# Patient Record
Sex: FEMALE | Race: OTHER | Employment: STUDENT | URBAN - METROPOLITAN AREA
[De-identification: names, ages, dates, MRNs, and addresses within clinical notes are randomized per-mention and may not be internally consistent; named-entity substitution may affect disease eponyms.]

---
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
## 2021-08-30 ENCOUNTER — OFFICE VISIT (OUTPATIENT)
Dept: PRIMARY CARE CLINIC | Age: 21
End: 2021-08-30

## 2021-08-30 VITALS
TEMPERATURE: 97.1 F | HEIGHT: 62 IN | SYSTOLIC BLOOD PRESSURE: 113 MMHG | BODY MASS INDEX: 20.61 KG/M2 | HEART RATE: 100 BPM | WEIGHT: 112 LBS | DIASTOLIC BLOOD PRESSURE: 73 MMHG | OXYGEN SATURATION: 100 %

## 2021-08-30 DIAGNOSIS — Z02.1 PHYSICAL EXAM, PRE-EMPLOYMENT: Primary | ICD-10-CM

## 2021-08-30 PROCEDURE — 99213 OFFICE O/P EST LOW 20 MIN: CPT | Performed by: NURSE PRACTITIONER

## 2021-08-30 ASSESSMENT — PATIENT HEALTH QUESTIONNAIRE - PHQ9
2. FEELING DOWN, DEPRESSED OR HOPELESS: 0
SUM OF ALL RESPONSES TO PHQ QUESTIONS 1-9: 0
1. LITTLE INTEREST OR PLEASURE IN DOING THINGS: 0
SUM OF ALL RESPONSES TO PHQ9 QUESTIONS 1 & 2: 0

## 2021-08-30 NOTE — PROGRESS NOTES
Chief Complaint:   Annual Exam    History of Present Illness   Source of history provided by:  patient. Francie Charles is a 24 y.o. old female who presents to walk-in for a pre-employment physical for [address] [place] [address]. Pt states she is feeling well without any complaints at this time. She denies any CP with exertion, KO, dizziness with exertion, history of syncope without trauma, palpitations, heavy menstrual periods, chance of pregnancy, weakness in extremities, recent illness, previous cardiac issues, seizure history, or asthma history. Denies any family history of sudden cardiac death. Pt denies any drug, ETOH, or tobacco use. Wears seat belt in the car at all times. Denies any thoughts of suicide or mental health issues. UTD on immunizations. Received 1st COVID vaccine. Review of Systems   Unless otherwise stated in this report or unable to obtain because of the patient's clinical or mental status as evidenced by the medical record, this patients's positive and negative responses for Review of Systems, constitutional, psych, eyes, ENT, cardiovascular, respiratory, gastrointestinal, neurological, genitourinary, musculoskeletal, integument systems and systems related to the presenting problem are either stated in the preceding or were not pertinent or were negative for the symptoms and/or complaints related to the medical problem. Past Medical History:  has no past medical history on file. Past Surgical History:  has a past surgical history that includes Tonsillectomy. Social History:  reports that she has never smoked. She has never used smokeless tobacco. She reports previous alcohol use. She reports previous drug use. Family History: family history is not on file. Allergies:  Other    Immunization History   Administered Date(s) Administered    COVID-19, Moderna, PF, 100mcg/0.5mL 08/11/2021    PPD Test 08/21/2019     Physical Exam   Vital Signs:  /73   Pulse 100   Temp 97.1 °F (36.2 °C) (Temporal)   Ht 5' 2\" (1.575 m)   Wt 112 lb (50.8 kg)   SpO2 100%   BMI 20.49 kg/m²    Oxygen Saturation Interpretation: Normal.    Constitutional:  A&Ox3, NAD, development consistent with age. Head:  NCAT  Eyes:  EOMI, PERRLA. No conjunctival injection noted. Ears:  External ears without lesions. Ear canals without swelling or exudate. TMs translucent bilaterally with normal light reflex. Throat:  Posterior pharynx without erythema or exudate. Airway patient. Neck:  Supple. Nontender without adenopathy or thyromegaly. Lungs: CTAB without wheezing, rales, or rhonchi. Heart:  RRR, normal heart sounds without pathological murmurs. Abdomen:  Soft, nontender, and nondistended. BS+x4. No guarding, rigidity, or rebound tenderness. No masses. Back:  ROM physiologic. Normal posture and spinal alignment. No costovertebral, paravertebral, intervertebral, or vertebral tenderness or spasm. Extremities:  No tenderness or swelling. ROM physiologic. No neurovascular deficit. Strength and  5/5 bilaterally. Skin:  Warm and appropriately dry without rashes, abrasions, ecchymoses, or lesions. Neuro:  Orientation age-appropriate. Motor functions intact. DTRs 2+ and equal bilaterally. Psych: Pleasant and appropriate. Normal mood and affect. Test Results Section   (All laboratory and radiology results have been personally reviewed by myself)  Labs:  No results found for this visit on 08/30/21. Imaging: All Radiology results interpreted by Radiologist unless otherwise noted. No results found. Assessment / Plan   Impression(s):  Shweta Buck was seen today for annual exam.    Diagnoses and all orders for this visit:    Physical exam, pre-employment    Pt appears to be in good health overall. She is cleared without restrictions. Advised to return immediately with any changes in physical or mental health. All questions answered.     Electronically signed by FARTUN Estrada CNP   DD: 8/30/21    **This report was transcribed using voice recognition software. Every effort was made to ensure accuracy; however, inadvertent computerized transcription errors may be present.

## 2021-10-25 ENCOUNTER — OFFICE VISIT (OUTPATIENT)
Dept: PRIMARY CARE CLINIC | Age: 21
End: 2021-10-25

## 2021-10-25 VITALS
HEART RATE: 113 BPM | HEIGHT: 62 IN | BODY MASS INDEX: 19.32 KG/M2 | SYSTOLIC BLOOD PRESSURE: 122 MMHG | WEIGHT: 105 LBS | TEMPERATURE: 97.2 F | OXYGEN SATURATION: 99 % | DIASTOLIC BLOOD PRESSURE: 70 MMHG

## 2021-10-25 DIAGNOSIS — R55 NEAR SYNCOPE: Primary | ICD-10-CM

## 2021-10-25 DIAGNOSIS — N92.6 IRREGULAR PERIODS: ICD-10-CM

## 2021-10-25 DIAGNOSIS — F32.A MILD DEPRESSION: ICD-10-CM

## 2021-10-25 DIAGNOSIS — R94.31 SHORTENED PR INTERVAL: ICD-10-CM

## 2021-10-25 LAB
ALBUMIN SERPL-MCNC: 4.5 G/DL (ref 3.5–5.2)
ALP BLD-CCNC: 64 U/L (ref 35–104)
ALT SERPL-CCNC: 17 U/L (ref 0–32)
ANION GAP SERPL CALCULATED.3IONS-SCNC: 15 MMOL/L (ref 7–16)
AST SERPL-CCNC: 26 U/L (ref 0–31)
BILIRUB SERPL-MCNC: 0.2 MG/DL (ref 0–1.2)
BUN BLDV-MCNC: 16 MG/DL (ref 6–20)
CALCIUM SERPL-MCNC: 9.4 MG/DL (ref 8.6–10.2)
CHLORIDE BLD-SCNC: 100 MMOL/L (ref 98–107)
CHP ED QC CHECK: NORMAL
CO2: 20 MMOL/L (ref 22–29)
CREAT SERPL-MCNC: 0.7 MG/DL (ref 0.5–1)
GFR AFRICAN AMERICAN: >60
GFR NON-AFRICAN AMERICAN: >60 ML/MIN/1.73
GLUCOSE BLD-MCNC: 115 MG/DL
GLUCOSE BLD-MCNC: 92 MG/DL (ref 74–99)
HCG QUALITATIVE: NEGATIVE
HCT VFR BLD CALC: 23.5 % (ref 34–48)
HEMOGLOBIN: 5.8 G/DL (ref 11.5–15.5)
MCH RBC QN AUTO: 13.3 PG (ref 26–35)
MCHC RBC AUTO-ENTMCNC: 24.7 % (ref 32–34.5)
MCV RBC AUTO: 54 FL (ref 80–99.9)
PDW BLD-RTO: 24.6 FL (ref 11.5–15)
PLATELET # BLD: 408 E9/L (ref 130–450)
PMV BLD AUTO: ABNORMAL FL (ref 7–12)
POTASSIUM SERPL-SCNC: 4.8 MMOL/L (ref 3.5–5)
PROLACTIN: 20.16 NG/ML
RBC # BLD: 4.35 E12/L (ref 3.5–5.5)
SODIUM BLD-SCNC: 135 MMOL/L (ref 132–146)
TOTAL PROTEIN: 7.7 G/DL (ref 6.4–8.3)
TSH SERPL DL<=0.05 MIU/L-ACNC: 1.27 UIU/ML (ref 0.27–4.2)
WBC # BLD: 6 E9/L (ref 4.5–11.5)

## 2021-10-25 PROCEDURE — 36415 COLL VENOUS BLD VENIPUNCTURE: CPT | Performed by: FAMILY MEDICINE

## 2021-10-25 PROCEDURE — 99214 OFFICE O/P EST MOD 30 MIN: CPT | Performed by: FAMILY MEDICINE

## 2021-10-25 PROCEDURE — 93000 ELECTROCARDIOGRAM COMPLETE: CPT | Performed by: FAMILY MEDICINE

## 2021-10-25 PROCEDURE — 82962 GLUCOSE BLOOD TEST: CPT | Performed by: FAMILY MEDICINE

## 2021-10-25 ASSESSMENT — ENCOUNTER SYMPTOMS
DIARRHEA: 0
COUGH: 0
SHORTNESS OF BREATH: 0
NAUSEA: 0
VOMITING: 0
ABDOMINAL PAIN: 0
WHEEZING: 0
CONSTIPATION: 0

## 2021-10-25 NOTE — PROGRESS NOTES
[facility])  Family Medicine Outpatient        SUBJECTIVE:  CC: had concerns including Other (near syncope episode when in the shower been going on for about 6 months ). HPI:  Patricia Ferreira is a female 22 y.o. presented to the walk-in clinic for concerns of near syncope episodes. She reports they first started approximately 6 months ago. She has had them a handful of times. She notes when they have occurred, she has been in the shower. She reports the shower has been warm. When she feels lightheaded and dizzy she will decrease the water temperature and get out as soon as possible. She has never fainted. When she gets out of the shower during these episodes she sits down for a while until she feels better. Her last episode was 2 days ago. Her sister wanted her to come get checked out so she came to walk-in today. She reports her last menstrual period was in August.  She has a history of irregular periods. Her menarche occurred at age 16 and they have been inconsistent since that time. She reports that she gets her periods every couple of months. She has never been sexually active now or in the past.  She denies any blood in her stool or abdominal pain. She reports her weight is stable and that she is eating well. She currently has no PCP. Review of Systems   Constitutional: Negative for appetite change, fatigue and fever. Respiratory: Negative for cough, shortness of breath and wheezing. Cardiovascular: Negative for chest pain, palpitations and leg swelling. Gastrointestinal: Negative for abdominal pain, constipation, diarrhea, nausea and vomiting. Genitourinary: Positive for menstrual problem. Negative for dysuria. Neurological: Positive for dizziness and light-headedness. Negative for seizures, syncope, weakness and numbness. Psychiatric/Behavioral: Negative for suicidal ideas. The patient is not nervous/anxious.          Depression       No outpatient medications have been marked as taking for the 10/25/21 encounter (Office Visit) with Mariah Curran MD.       I have reviewed all pertinent PMHx, PSHx, FamHx, SocialHx, medications, and allergies and updated history as appropriate. OBJECTIVE    VS: /70   Pulse 113   Temp 97.2 °F (36.2 °C) (Temporal)   Ht 5' 2\" (1.575 m)   Wt 105 lb (47.6 kg)   SpO2 99%   BMI 19.20 kg/m²   Physical Exam  Constitutional:       General: She is not in acute distress. Appearance: She is well-developed. She is not diaphoretic. HENT:      Head: Normocephalic and atraumatic. Right Ear: Tympanic membrane normal. There is no impacted cerumen. Left Ear: Tympanic membrane normal. There is no impacted cerumen. Nose: Nose normal.      Mouth/Throat:      Mouth: Mucous membranes are moist.      Pharynx: No oropharyngeal exudate or posterior oropharyngeal erythema. Eyes:      Conjunctiva/sclera: Conjunctivae normal.      Pupils: Pupils are equal, round, and reactive to light. Cardiovascular:      Rate and Rhythm: Normal rate and regular rhythm. Pulmonary:      Effort: Pulmonary effort is normal.      Breath sounds: Normal breath sounds. Abdominal:      General: Bowel sounds are normal. There is no distension. Palpations: Abdomen is soft. Tenderness: There is no abdominal tenderness. Hernia: No hernia is present. Musculoskeletal:         General: No tenderness. Normal range of motion. Cervical back: Normal range of motion and neck supple. Skin:     General: Skin is warm and dry. Neurological:      Mental Status: She is alert and oriented to person, place, and time. Cranial Nerves: No cranial nerve deficit. Motor: No weakness. Psychiatric:         Mood and Affect: Mood normal.         Behavior: Behavior normal.         ASSESSMENT/PLAN:  1. Near syncope  Random glucose after eating 115 mg/dl today. 12 lead NSR with shorted IL interval. See #2. Lmp 8/2021 with irregular periods.  Patient denies blood in her stool. Baseline lab work ordered at this time. Orthostatics negative. Recommend patient take luke warm showers at this time. - POCT Glucose  - EKG 12 Lead    2. Shortened [place] interval  - Ambika Wagoner MD, Cardiology, [place]    3. Irregular periods  Lmp 8/2021. Baseline work up as above. - TSH without Reflex; Future  - CBC; Future  - Comprehensive Metabolic Panel; Future  - Testosterone, free, total; Future  - PROLACTIN; Future  - HCG, SERUM, QUALITATIVE; Future    4. Mild depression [facility])  Patient reports following with psychiatry. Denies any suicidal homicidal ideations. Continue to follow with specialist..    Patient to establish care with me. F/u in office in 2 weeks. I have reviewed my findings and recommendations with Yadira Tay MD  10/26/2021 12:31 PM  Return in about 2 weeks (around 11/8/2021). Counseled regarding above diagnosis, including possible risks and complications, especially if left uncontrolled. Patient counseled on red flag symptoms and if they occur to go to the ED. Discussed medications risk/benefits and possible side effects and alternatives to treatment. Patient and/or guardian verbalizes understanding, agrees, feels comfortable with and wishes to proceed with above treatment plan. Advised patient regarding importance of keeping up with recommended health maintenance and to schedule as soon as possible if overdue, as this is important in assessing for undiagnosed pathology, especially cancer, as well as protecting against potentially harmful/life threatening disease. Patient and/or guardian verbalizes understanding and agrees with above counseling, assessment and plan. All questions answered.     Please note this report has been partially produced using speech recognition software  and may contain errors related to that system including grammar, punctuation and spelling as well as words and phrases that may seem inappropriate. If there are questions or concerns please feel free to contact me to clarify.

## 2021-10-26 ENCOUNTER — TELEPHONE (OUTPATIENT)
Dept: PRIMARY CARE CLINIC | Age: 21
End: 2021-10-26

## 2021-10-26 ENCOUNTER — HOSPITAL ENCOUNTER (EMERGENCY)
Age: 21
Discharge: HOME | End: 2021-10-26
Attending: EMERGENCY MEDICINE
Payer: COMMERCIAL

## 2021-10-26 VITALS
OXYGEN SATURATION: 100 % | RESPIRATION RATE: 18 BRPM | HEART RATE: 91 BPM | DIASTOLIC BLOOD PRESSURE: 64 MMHG | TEMPERATURE: 98.2 F | WEIGHT: 105 LBS | BODY MASS INDEX: 19.2 KG/M2 | SYSTOLIC BLOOD PRESSURE: 104 MMHG

## 2021-10-26 DIAGNOSIS — D50.9 MICROCYTIC ANEMIA: ICD-10-CM

## 2021-10-26 DIAGNOSIS — D50.9 IRON DEFICIENCY ANEMIA, UNSPECIFIED IRON DEFICIENCY ANEMIA TYPE: Primary | ICD-10-CM

## 2021-10-26 DIAGNOSIS — D64.9 ANEMIA, UNSPECIFIED TYPE: Primary | ICD-10-CM

## 2021-10-26 PROBLEM — R94.31 SHORTENED PR INTERVAL: Status: ACTIVE | Noted: 2021-10-26

## 2021-10-26 PROBLEM — F32.A MILD DEPRESSION: Status: ACTIVE | Noted: 2021-10-26

## 2021-10-26 PROBLEM — R55 NEAR SYNCOPE: Status: ACTIVE | Noted: 2021-10-26

## 2021-10-26 PROBLEM — N92.6 IRREGULAR PERIODS: Status: ACTIVE | Noted: 2021-10-26

## 2021-10-26 LAB
ABO/RH: NORMAL
ALBUMIN SERPL-MCNC: 4.6 G/DL (ref 3.5–5.2)
ALP BLD-CCNC: 65 U/L (ref 35–104)
ALT SERPL-CCNC: 15 U/L (ref 0–32)
ANION GAP SERPL CALCULATED.3IONS-SCNC: 11 MMOL/L (ref 7–16)
ANISOCYTOSIS: ABNORMAL
ANISOCYTOSIS: ABNORMAL
ANTIBODY SCREEN: NORMAL
AST SERPL-CCNC: 18 U/L (ref 0–31)
BACTERIA: ABNORMAL /HPF
BASOPHILS ABSOLUTE: 0.04 E9/L (ref 0–0.2)
BASOPHILS ABSOLUTE: 0.08 E9/L (ref 0–0.2)
BASOPHILS RELATIVE PERCENT: 0.9 % (ref 0–2)
BASOPHILS RELATIVE PERCENT: 1.7 % (ref 0–2)
BILIRUB SERPL-MCNC: 0.3 MG/DL (ref 0–1.2)
BILIRUBIN URINE: NEGATIVE
BLOOD BANK DISPENSE STATUS: NORMAL
BLOOD BANK PRODUCT CODE: NORMAL
BLOOD, URINE: NEGATIVE
BPU ID: NORMAL
BUN BLDV-MCNC: 11 MG/DL (ref 6–20)
CALCIUM SERPL-MCNC: 9.7 MG/DL (ref 8.6–10.2)
CHLORIDE BLD-SCNC: 101 MMOL/L (ref 98–107)
CLARITY: CLEAR
CO2: 24 MMOL/L (ref 22–29)
COLOR: YELLOW
CREAT SERPL-MCNC: 0.6 MG/DL (ref 0.5–1)
DESCRIPTION BLOOD BANK: NORMAL
EOSINOPHILS ABSOLUTE: 0 E9/L (ref 0.05–0.5)
EOSINOPHILS ABSOLUTE: 0 E9/L (ref 0.05–0.5)
EOSINOPHILS RELATIVE PERCENT: 0.2 % (ref 0–6)
EOSINOPHILS RELATIVE PERCENT: 0.9 % (ref 0–6)
FERRITIN: 3 NG/ML
FERRITIN: 4 NG/ML
GFR AFRICAN AMERICAN: >60
GFR NON-AFRICAN AMERICAN: >60 ML/MIN/1.73
GLUCOSE BLD-MCNC: 97 MG/DL (ref 74–99)
GLUCOSE URINE: NEGATIVE MG/DL
HCG, URINE, POC: NEGATIVE
HCT VFR BLD CALC: 23.3 % (ref 34–48)
HCT VFR BLD CALC: 24 % (ref 34–48)
HCT VFR BLD CALC: 24.4 % (ref 34–48)
HEMOGLOBIN: 5.9 G/DL (ref 11.5–15.5)
HEMOGLOBIN: 6.1 G/DL (ref 11.5–15.5)
HYPOCHROMIA: ABNORMAL
HYPOCHROMIA: ABNORMAL
IMMATURE RETIC FRACT: 20.3 % (ref 3–15.9)
IMMATURE RETIC FRACT: 21.4 % (ref 3–15.9)
IRON SATURATION: 2 % (ref 15–50)
IRON: 10 MCG/DL (ref 37–145)
KETONES, URINE: NEGATIVE MG/DL
LEUKOCYTE ESTERASE, URINE: ABNORMAL
LYMPHOCYTES ABSOLUTE: 0.82 E9/L (ref 1.5–4)
LYMPHOCYTES ABSOLUTE: 0.99 E9/L (ref 1.5–4)
LYMPHOCYTES RELATIVE PERCENT: 16.5 % (ref 20–42)
LYMPHOCYTES RELATIVE PERCENT: 20.9 % (ref 20–42)
Lab: NORMAL
MCH RBC QN AUTO: 13.4 PG (ref 26–35)
MCH RBC QN AUTO: 13.7 PG (ref 26–35)
MCHC RBC AUTO-ENTMCNC: 25 % (ref 32–34.5)
MCHC RBC AUTO-ENTMCNC: 25.3 % (ref 32–34.5)
MCV RBC AUTO: 53.1 FL (ref 80–99.9)
MCV RBC AUTO: 55 FL (ref 80–99.9)
MONOCYTES ABSOLUTE: 0.19 E9/L (ref 0.1–0.95)
MONOCYTES ABSOLUTE: 0.47 E9/L (ref 0.1–0.95)
MONOCYTES RELATIVE PERCENT: 10.4 % (ref 2–12)
MONOCYTES RELATIVE PERCENT: 3.5 % (ref 2–12)
NEGATIVE QC PASS/FAIL: NORMAL
NEUTROPHILS ABSOLUTE: 3.2 E9/L (ref 1.8–7.3)
NEUTROPHILS ABSOLUTE: 3.74 E9/L (ref 1.8–7.3)
NEUTROPHILS RELATIVE PERCENT: 67.8 % (ref 43–80)
NEUTROPHILS RELATIVE PERCENT: 78.3 % (ref 43–80)
NITRITE, URINE: NEGATIVE
OVALOCYTES: ABNORMAL
OVALOCYTES: ABNORMAL
PATHOLOGIST REVIEW: NORMAL
PDW BLD-RTO: 24.2 FL (ref 11.5–15)
PDW BLD-RTO: 24.6 FL (ref 11.5–15)
PH UA: 7.5 (ref 5–9)
PLATELET # BLD: 358 E9/L (ref 130–450)
PLATELET # BLD: 382 E9/L (ref 130–450)
PMV BLD AUTO: ABNORMAL FL (ref 7–12)
PMV BLD AUTO: ABNORMAL FL (ref 7–12)
POIKILOCYTES: ABNORMAL
POIKILOCYTES: ABNORMAL
POLYCHROMASIA: ABNORMAL
POLYCHROMASIA: ABNORMAL
POSITIVE QC PASS/FAIL: NORMAL
POTASSIUM SERPL-SCNC: 3.9 MMOL/L (ref 3.5–5)
PROTEIN UA: NEGATIVE MG/DL
RBC # BLD: 4.39 E12/L (ref 3.5–5.5)
RBC # BLD: 4.44 E12/L (ref 3.5–5.5)
RBC UA: ABNORMAL /HPF (ref 0–2)
RETIC HGB EQUIVALENT: 13.3 PG (ref 28.2–36.6)
RETIC HGB EQUIVALENT: 13.7 PG (ref 28.2–36.6)
RETICULOCYTE ABSOLUTE COUNT: 0.06 E12/L
RETICULOCYTE ABSOLUTE COUNT: 0.06 E12/L
RETICULOCYTE COUNT PCT: 1.3 % (ref 0.4–1.9)
RETICULOCYTE COUNT PCT: 1.7 % (ref 0.4–1.9)
SCHISTOCYTES: ABNORMAL
SODIUM BLD-SCNC: 136 MMOL/L (ref 132–146)
SPECIFIC GRAVITY UA: 1.02 (ref 1–1.03)
TARGET CELLS: ABNORMAL
TARGET CELLS: ABNORMAL
TEAR DROP CELLS: ABNORMAL
TOTAL IRON BINDING CAPACITY: 461 MCG/DL (ref 250–450)
TOTAL PROTEIN: 8.1 G/DL (ref 6.4–8.3)
TRANSFERRIN: 368 MG/DL (ref 200–360)
TRANSFERRIN: 391 MG/DL (ref 200–360)
UROBILINOGEN, URINE: 0.2 E.U./DL
WBC # BLD: 4.7 E9/L (ref 4.5–11.5)
WBC # BLD: 4.8 E9/L (ref 4.5–11.5)
WBC UA: ABNORMAL /HPF (ref 0–5)

## 2021-10-26 PROCEDURE — 85025 COMPLETE CBC W/AUTO DIFF WBC: CPT

## 2021-10-26 PROCEDURE — 85045 AUTOMATED RETICULOCYTE COUNT: CPT

## 2021-10-26 PROCEDURE — 82728 ASSAY OF FERRITIN: CPT

## 2021-10-26 PROCEDURE — 86900 BLOOD TYPING SEROLOGIC ABO: CPT

## 2021-10-26 PROCEDURE — 86901 BLOOD TYPING SEROLOGIC RH(D): CPT

## 2021-10-26 PROCEDURE — 86923 COMPATIBILITY TEST ELECTRIC: CPT

## 2021-10-26 PROCEDURE — 36415 COLL VENOUS BLD VENIPUNCTURE: CPT

## 2021-10-26 PROCEDURE — 36430 TRANSFUSION BLD/BLD COMPNT: CPT

## 2021-10-26 PROCEDURE — P9016 RBC LEUKOCYTES REDUCED: HCPCS

## 2021-10-26 PROCEDURE — 80053 COMPREHEN METABOLIC PANEL: CPT

## 2021-10-26 PROCEDURE — 99284 EMERGENCY DEPT VISIT MOD MDM: CPT

## 2021-10-26 PROCEDURE — 2580000003 HC RX 258: Performed by: EMERGENCY MEDICINE

## 2021-10-26 PROCEDURE — 81001 URINALYSIS AUTO W/SCOPE: CPT

## 2021-10-26 PROCEDURE — 86850 RBC ANTIBODY SCREEN: CPT

## 2021-10-26 PROCEDURE — 84466 ASSAY OF TRANSFERRIN: CPT

## 2021-10-26 RX ORDER — SODIUM CHLORIDE 9 MG/ML
INJECTION, SOLUTION INTRAVENOUS PRN
Status: COMPLETED | OUTPATIENT
Start: 2021-10-26 | End: 2021-10-26

## 2021-10-26 RX ORDER — FERROUS SULFATE 325(65) MG
325 TABLET ORAL 2 TIMES DAILY
Qty: 60 TABLET | Refills: 0 | Status: SHIPPED | OUTPATIENT
Start: 2021-10-26 | End: 2021-10-27

## 2021-10-26 RX ADMIN — SODIUM CHLORIDE: 9 INJECTION, SOLUTION INTRAVENOUS at 20:30

## 2021-10-26 ASSESSMENT — ENCOUNTER SYMPTOMS
SHORTNESS OF BREATH: 0
CHEST TIGHTNESS: 0
ABDOMINAL PAIN: 0

## 2021-10-26 NOTE — ED NOTES
FIRST PROVIDER CONTACT ASSESSMENT NOTE           Department of Emergency Medicine                 First Provider Note            10/26/21  10:54 AM EDT    Date of Encounter: No admission date for patient encounter. Patient Name: Ventura Junior  : 2000  MRN: [id number]    Chief Complaint: Abnormal Lab (Hgb low )      History of Present Illness:   Ventura Junior is a 22 y.o. female who presents to the ED for low hemoglobin. Patient states that since August she has felt \"dizzy in the shower therefore went to the doctor and was found to have a low hemoglobin. \"  Patient states she was sent in for evaluation. Patient states that she does have irregular menstrual cycles when she does have a cycle they are very heavy. She states that she feels shortness of breath with moving around. Focused Physical Exam:  VS:    ED Triage Vitals [10/26/21 1048]   BP Temp Temp src Pulse Resp SpO2 Height Weight   -- -- -- 124 -- 100 % -- --        Physical Ex: Constitutional: Alert and non-toxic. Medical History:  has no past medical history on file. Surgical History:  has a past surgical history that includes Tonsillectomy. Social History:  reports that she has never smoked. She has never used smokeless tobacco. She reports previous alcohol use. She reports previous drug use. Family History: family history is not on file. Allergies:  Other     Initial Plan of Care: Initiate Treatment-Testing, Proceed toTreatment Area When Bed Available for ED Attending/MLP to Continue Care      ---END OF FIRST PROVIDER CONTACT ASSESSMENT NOTE---  Electronically signed by Vinicio Dave PA-C   DD: 10/26/21       Vinicio Dave PA-C  10/26/21 0628

## 2021-10-26 NOTE — LETTER
[address] [facility] Emergency Department  [address]  [place] [address]  Phone: [phone]               October 26, 2021    Patient: Vianney Delong   YOB: 2000   Date of Visit: 10/26/2021       To Whom It May Concern:    Vianney Delong was seen and treated in our emergency department on 10/26/2021.  She may return to work/school on 10/27/2021    Sincerely,       Tate De Los Santos RN         Signature:__________________________________

## 2021-10-26 NOTE — TELEPHONE ENCOUNTER
Attempted to contact patient again this morning with no answer. Also contacted her sister Abeba Tsang who is on her HIPPA form. No answer. Voicemail left. [address] check to be called on patient. Patient to be advised to go to ER for repeat hgb with immediate work up and treatment as needed.

## 2021-10-26 NOTE — ED PROVIDER NOTES
23-year-old female presenting with concern about low hemoglobin. She has been dizzy and lightheaded for the past few days. Also short of breath with any exertion according to sister who is with her. Patient has irregular, heavy periods. Her last menstrual cycle was about 2 months ago and it tends to come every couple of months. No complaints right now, no lightheadedness while sitting, she is tachycardic at about 120, no shortness of breath, no dyspnea currently, no tachypnea at this time. Abdominal pain is not present. Gradual onset, persistent, moderate to severe, no improvement at home. History reviewed. No pertinent family history. Past Surgical History:   Procedure Laterality Date    TONSILLECTOMY         Review of Systems   Constitutional: Negative for chills and fever. Respiratory: Negative for chest tightness and shortness of breath. Cardiovascular: Negative for chest pain. Gastrointestinal: Negative for abdominal pain. Anemia   All other systems reviewed and are negative. Physical Exam  Constitutional:       General: She is not in acute distress. Appearance: She is well-developed. HENT:      Head: Normocephalic and atraumatic. Eyes:      Conjunctiva/sclera: Conjunctivae normal.      Pupils: Pupils are equal, round, and reactive to light. Neck:      Thyroid: No thyromegaly. Cardiovascular:      Rate and Rhythm: Regular rhythm. Tachycardia present. Pulmonary:      Effort: Pulmonary effort is normal. No respiratory distress. Breath sounds: Normal breath sounds. Abdominal:      General: There is no distension. Palpations: Abdomen is soft. Tenderness: There is no abdominal tenderness. There is no guarding or rebound. Musculoskeletal:         General: No tenderness. Normal range of motion. Cervical back: Normal range of motion. Skin:     General: Skin is warm and dry. Coloration: Skin is pale. Findings: No erythema. Neurological:      Mental Status: She is alert and oriented to person, place, and time. Cranial Nerves: No cranial nerve deficit. Coordination: Coordination normal.          Procedures     [facility]     ED Course as of Oct 27 0027   Tue Oct 26, 2021   1749 TRANSFERRIN(!):    Transferrin 391(!) [SO]      ED Course User Index  [SO] Aranza Snyder DO        ED Course as of Oct 27 0028   Tue Oct 26, 2021   1749 TRANSFERRIN(!):    Transferrin 391(!) [SO]      ED Course User Index  [SO] Aranza Snyder DO       --------------------------------------------- PAST HISTORY ---------------------------------------------  Past Medical History:  has no past medical history on file. Past Surgical History:  has a past surgical history that includes Tonsillectomy. Social History:  reports that she has never smoked. She has never used smokeless tobacco. She reports previous alcohol use. She reports previous drug use. Family History: family history is not on file. The patients home medications have been reviewed. Allergies:  Other    -------------------------------------------------- RESULTS -------------------------------------------------  Labs:  Results for orders placed or performed during the hospital encounter of 10/26/21   CBC auto differential   Result Value Ref Range    WBC 4.7 4.5 - 11.5 E9/L    RBC 4.44 3.50 - 5.50 E12/L    Hemoglobin 6.1 (LL) 11.5 - 15.5 g/dL    Hematocrit 24.4 (L) 34.0 - 48.0 %    MCV 55.0 (L) 80.0 - 99.9 fL    MCH 13.7 (L) 26.0 - 35.0 pg    MCHC 25.0 (L) 32.0 - 34.5 %    RDW 24.6 (H) 11.5 - 15.0 fL    Platelets 544 595 - 634 E9/L    MPV NOT CALC 7.0 - 12.0 fL    Neutrophils % 67.8 43.0 - 80.0 %    Lymphocytes % 20.9 20.0 - 42.0 %    Monocytes % 10.4 2.0 - 12.0 %    Eosinophils % 0.9 0.0 - 6.0 %    Basophils % 0.9 0.0 - 2.0 %    Neutrophils Absolute 3.20 1.80 - 7.30 E9/L    Lymphocytes Absolute 0.99 (L) 1.50 - 4.00 E9/L    Monocytes Absolute 0.47 0.10 - 0.95 E9/L    Eosinophils Absolute 0.00 (L) 0.05 - 0.50 E9/L    Basophils Absolute 0.04 0.00 - 0.20 E9/L    Anisocytosis 2+     Polychromasia 2+     Hypochromia 3+     Poikilocytes 2+     Ovalocytes 2+     Target Cells 1+    Comprehensive Metabolic Panel   Result Value Ref Range    Sodium 136 132 - 146 mmol/L    Potassium 3.9 3.5 - 5.0 mmol/L    Chloride 101 98 - 107 mmol/L    CO2 24 22 - 29 mmol/L    Anion Gap 11 7 - 16 mmol/L    Glucose 97 74 - 99 mg/dL    BUN 11 6 - 20 mg/dL    CREATININE 0.6 0.5 - 1.0 mg/dL    GFR Non-African American >60 >=60 mL/min/1.73    GFR African American >60     Calcium 9.7 8.6 - 10.2 mg/dL    Total Protein 8.1 6.4 - 8.3 g/dL    Albumin 4.6 3.5 - 5.2 g/dL    Total Bilirubin 0.3 0.0 - 1.2 mg/dL    Alkaline Phosphatase 65 35 - 104 U/L    ALT 15 0 - 32 U/L    AST 18 0 - 31 U/L   FERRITIN   Result Value Ref Range    Ferritin 3 ng/mL   RETICULOCYTES   Result Value Ref Range    Retic Ct Pct 1.3 0.4 - 1.9 %    Retic Ct Abs 0.060 E12/L    Immature Retic Fract 21.4 (H) 3.0 - 15.9 %    Hematocrit 23.3 (L) 34.0 - 48.0 %    Retic HGB Equivalent 13.7 (L) 28.2 - 36.6 pg   TRANSFERRIN   Result Value Ref Range    Transferrin 391 (H) 200 - 360 mg/dL   Urinalysis   Result Value Ref Range    Color, UA Yellow Straw/Yellow    Clarity, UA Clear Clear    Glucose, Ur Negative Negative mg/dL    Bilirubin Urine Negative Negative    Ketones, Urine Negative Negative mg/dL    Specific Gravity, UA 1.020 1.005 - 1.030    Blood, Urine Negative Negative    pH, UA 7.5 5.0 - 9.0    Protein, UA Negative Negative mg/dL    Urobilinogen, Urine 0.2 <2.0 E.U./dL    Nitrite, Urine Negative Negative    Leukocyte Esterase, Urine SMALL (A) Negative   CBC Auto Differential   Result Value Ref Range    WBC 4.8 4.5 - 11.5 E9/L    RBC 4.39 3.50 - 5.50 E12/L    Hemoglobin 5.9 (LL) 11.5 - 15.5 g/dL    MCV 53.1 (L) 80.0 - 99.9 fL    MCH 13.4 (L) 26.0 - 35.0 pg    MCHC 25.3 (L) 32.0 - 34.5 %    RDW 24.2 (H) 11.5 - 15.0 fL    Platelets 999 884 - 927 E9/L    MPV NOT CALC 7.0 - 12.0 fL    Neutrophils % 78.3 43.0 - 80.0 %    Lymphocytes % 16.5 (L) 20.0 - 42.0 %    Monocytes % 3.5 2.0 - 12.0 %    Eosinophils % 0.2 0.0 - 6.0 %    Basophils % 1.7 0.0 - 2.0 %    Neutrophils Absolute 3.74 1.80 - 7.30 E9/L    Lymphocytes Absolute 0.82 (L) 1.50 - 4.00 E9/L    Monocytes Absolute 0.19 0.10 - 0.95 E9/L    Eosinophils Absolute 0.00 (L) 0.05 - 0.50 E9/L    Basophils Absolute 0.08 0.00 - 0.20 E9/L    Anisocytosis 2+     Polychromasia 2+     Hypochromia 2+     Poikilocytes 1+     Schistocytes 2+     Ovalocytes 1+     Target Cells 1+     Tear Drop Cells 1+    Microscopic Urinalysis   Result Value Ref Range    WBC, UA 1-3 0 - 5 /HPF    RBC, UA NONE 0 - 2 /HPF    Bacteria, UA RARE (A) None Seen /HPF   POC Pregnancy Urine Qual   Result Value Ref Range    HCG, Urine, POC Negative Negative    Lot Number [id number]     Positive QC Pass/Fail Pass     Negative QC Pass/Fail Pass    TYPE AND SCREEN   Result Value Ref Range    ABO/Rh O POS     Antibody Screen NEG    PREPARE RBC (CROSSMATCH)   Result Value Ref Range    Product Code Blood Bank [id number]     Description Blood Bank Red Blood Cells, Apheresis, Leuko-reduced     Unit Number [id number]     Dispense Status Blood Bank issued        Radiology:  No orders to display       ------------------------- NURSING NOTES AND VITALS REVIEWED ---------------------------  Date / Time Roomed:  10/26/2021  2:39 PM  ED Bed Assignment:  37/37    The nursing notes within the ED encounter and vital signs as below have been reviewed. /64   Pulse 91   Temp 98.2 °F (36.8 °C)   Resp 18   Wt 105 lb (47.6 kg)   SpO2 100%   BMI 19.20 kg/m²   Oxygen Saturation Interpretation: Normal      ------------------------------------------ PROGRESS NOTES ------------------------------------------  I have spoken with the patient and discussed todays results, in addition to providing specific details for the plan of care and counseling regarding the diagnosis and prognosis.   Their questions are answered at this time and they are agreeable with the plan. I discussed at length with them reasons for immediate return here for re evaluation. They will followup with primary care by calling their office tomorrow. --------------------------------- ADDITIONAL PROVIDER NOTES ---------------------------------  At this time the patient is without objective evidence of an acute process requiring hospitalization or inpatient management. They have remained hemodynamically stable throughout their entire ED visit and are stable for discharge with outpatient follow-up. The plan has been discussed in detail and they are aware of the specific conditions for emergent return, as well as the importance of follow-up. Discharge Medication List as of 10/26/2021  7:21 PM      START taking these medications    Details   ferrous sulfate (IRON 325) 325 (65 Fe) MG tablet Take 1 tablet by mouth 2 times daily, Disp-60 tablet, R-0Print             Diagnosis:  1. Anemia, unspecified type        Disposition:  Patient's disposition: Discharge to home  Patient's condition is stable.            Bhargav Quinn DO  10/27/21 0028

## 2021-10-26 NOTE — TELEPHONE ENCOUNTER
Patient called back. Patient currently asymptomatic. No previous baseline hgb and denies any history of anemia. Discussed results and importance of going to the ED this morning for repeat hgb along with further immediate work up/treatment as indicated. Patient amendable to go to the ED and reports her sister will take her now to [facility] E's main.

## 2021-10-27 LAB
PATHOLOGIST REVIEW: NORMAL
SEX HORMONE BINDING GLOBULIN: 79 NMOL/L (ref 30–135)
TESTOSTERONE FREE-NONMALE: 7.7 PG/ML (ref 0.8–7.4)
TESTOSTERONE TOTAL: 77 NG/DL (ref 20–70)

## 2021-10-27 RX ORDER — DOCUSATE SODIUM 100 MG/1
100 CAPSULE, LIQUID FILLED ORAL 2 TIMES DAILY PRN
Qty: 60 CAPSULE | Refills: 0 | Status: SHIPPED | OUTPATIENT
Start: 2021-10-27 | End: 2021-11-26

## 2021-10-27 RX ORDER — FERROUS SULFATE 325(65) MG
325 TABLET ORAL
Qty: 90 TABLET | Refills: 0
Start: 2021-10-27 | End: 2021-12-08

## 2021-10-27 NOTE — ED NOTES
Per this RNs conversation with Dr Nolberto Che, pt does not need to have her hemoglobin/ HCT redrawn prior to dc from ED. Pt is stable leaving ED. Denies dizziness and shortness of breath. Pt with sister at time of dc.       Marly Edward RN  10/26/21 2038

## 2021-10-28 ENCOUNTER — NURSE ONLY (OUTPATIENT)
Dept: PRIMARY CARE CLINIC | Age: 21
End: 2021-10-28

## 2021-10-28 DIAGNOSIS — D50.9 IRON DEFICIENCY ANEMIA, UNSPECIFIED IRON DEFICIENCY ANEMIA TYPE: ICD-10-CM

## 2021-10-28 LAB
ANISOCYTOSIS: ABNORMAL
BASOPHILS ABSOLUTE: 0.07 E9/L (ref 0–0.2)
BASOPHILS RELATIVE PERCENT: 1.7 % (ref 0–2)
EOSINOPHILS ABSOLUTE: 0.1 E9/L (ref 0.05–0.5)
EOSINOPHILS RELATIVE PERCENT: 2.6 % (ref 0–6)
HCT VFR BLD CALC: 29.1 % (ref 34–48)
HEMOGLOBIN: 7.8 G/DL (ref 11.5–15.5)
HYPOCHROMIA: ABNORMAL
LYMPHOCYTES ABSOLUTE: 0.84 E9/L (ref 1.5–4)
LYMPHOCYTES RELATIVE PERCENT: 20.9 % (ref 20–42)
MCH RBC QN AUTO: 15.6 PG (ref 26–35)
MCHC RBC AUTO-ENTMCNC: 26.8 % (ref 32–34.5)
MCV RBC AUTO: 58.3 FL (ref 80–99.9)
MONOCYTES ABSOLUTE: 0.24 E9/L (ref 0.1–0.95)
MONOCYTES RELATIVE PERCENT: 6.1 % (ref 2–12)
NEUTROPHILS ABSOLUTE: 2.76 E9/L (ref 1.8–7.3)
NEUTROPHILS RELATIVE PERCENT: 68.7 % (ref 43–80)
OVALOCYTES: ABNORMAL
PDW BLD-RTO: 30.4 FL (ref 11.5–15)
PLATELET # BLD: 273 E9/L (ref 130–450)
PMV BLD AUTO: ABNORMAL FL (ref 7–12)
POIKILOCYTES: ABNORMAL
POLYCHROMASIA: ABNORMAL
RBC # BLD: 4.99 E12/L (ref 3.5–5.5)
SCHISTOCYTES: ABNORMAL
TARGET CELLS: ABNORMAL
TEAR DROP CELLS: ABNORMAL
WBC # BLD: 4 E9/L (ref 4.5–11.5)

## 2021-11-03 ENCOUNTER — OFFICE VISIT (OUTPATIENT)
Dept: PRIMARY CARE CLINIC | Age: 21
End: 2021-11-03

## 2021-11-03 VITALS
HEIGHT: 62 IN | DIASTOLIC BLOOD PRESSURE: 60 MMHG | TEMPERATURE: 97.7 F | OXYGEN SATURATION: 99 % | HEART RATE: 109 BPM | WEIGHT: 112 LBS | SYSTOLIC BLOOD PRESSURE: 102 MMHG | BODY MASS INDEX: 20.61 KG/M2

## 2021-11-03 DIAGNOSIS — N92.6 IRREGULAR PERIODS: ICD-10-CM

## 2021-11-03 DIAGNOSIS — D50.9 IRON DEFICIENCY ANEMIA, UNSPECIFIED IRON DEFICIENCY ANEMIA TYPE: Primary | ICD-10-CM

## 2021-11-03 LAB — HGB, POC: 8.5

## 2021-11-03 PROCEDURE — 99214 OFFICE O/P EST MOD 30 MIN: CPT | Performed by: FAMILY MEDICINE

## 2021-11-03 PROCEDURE — 85018 HEMOGLOBIN: CPT | Performed by: FAMILY MEDICINE

## 2021-11-03 SDOH — ECONOMIC STABILITY: FOOD INSECURITY: WITHIN THE PAST 12 MONTHS, YOU WORRIED THAT YOUR FOOD WOULD RUN OUT BEFORE YOU GOT MONEY TO BUY MORE.: NEVER TRUE

## 2021-11-03 SDOH — ECONOMIC STABILITY: FOOD INSECURITY: WITHIN THE PAST 12 MONTHS, THE FOOD YOU BOUGHT JUST DIDN'T LAST AND YOU DIDN'T HAVE MONEY TO GET MORE.: NEVER TRUE

## 2021-11-03 ASSESSMENT — SOCIAL DETERMINANTS OF HEALTH (SDOH): HOW HARD IS IT FOR YOU TO PAY FOR THE VERY BASICS LIKE FOOD, HOUSING, MEDICAL CARE, AND HEATING?: HARD

## 2021-11-10 ENCOUNTER — OFFICE VISIT (OUTPATIENT)
Dept: CARDIOLOGY CLINIC | Age: 21
End: 2021-11-10
Payer: COMMERCIAL

## 2021-11-10 VITALS
HEIGHT: 62 IN | DIASTOLIC BLOOD PRESSURE: 76 MMHG | BODY MASS INDEX: 20.46 KG/M2 | SYSTOLIC BLOOD PRESSURE: 112 MMHG | HEART RATE: 98 BPM | WEIGHT: 111.2 LBS | OXYGEN SATURATION: 98 % | RESPIRATION RATE: 16 BRPM

## 2021-11-10 DIAGNOSIS — R94.31 ABNORMAL EKG: Primary | ICD-10-CM

## 2021-11-10 DIAGNOSIS — R06.09 DOE (DYSPNEA ON EXERTION): ICD-10-CM

## 2021-11-10 PROCEDURE — 93000 ELECTROCARDIOGRAM COMPLETE: CPT | Performed by: INTERNAL MEDICINE

## 2021-11-10 PROCEDURE — 99204 OFFICE O/P NEW MOD 45 MIN: CPT | Performed by: INTERNAL MEDICINE

## 2021-11-10 NOTE — PATIENT INSTRUCTIONS
 Continue all your medications at current doses.  We will schedule an echocardiogram.    Goal BP is less than 130/80.  Please try to exercise for 150 minutes a week.  Follow up with me as needed.

## 2021-11-10 NOTE — PROGRESS NOTES
CHIEF COMPLAINT:   Chief Complaint   Patient presents with    Abnormal Test Results     Consult per Dr. Karla Enriquez for abn EKG. Palpitations with activity accompanied by SOB, lasting , 5 minutes. Never syncopal. Denies edema. HISTORY OF PRESENT ILLNESS: Patient is a 22 y.o. female seen at the request of Adal Major MD to establish care with me. She has a history of recently diagnosed anemia, otherwise no significant cardiac history. For the last few months, she has been complaining of fatigue and dyspnea on exertion. She was also having dizziness. Blood work revealed that she had a hemoglobin level as low as 5.9. She received a transfusion of 1 unit. She feels her symptoms are better in response. Her work-up is ongoing and she has a follow-up visit with a hematologist tomorrow. She does not endorse any active episodes of bleeding. She does have an irregular menstrual cycle with episodes of heavy bleeding. At today's office visit, She denies any chest pain,  palpitations, dizziness, pedal edema. The patient is capable of activities of daily living. There is dyspnea on more than moderate exertion. There is no orthopnea or PND. She is compliant with medications as well as diet and exercise regimen. Past Medical History:   Diagnosis Date    Constipation        Allergies   Allergen Reactions    Other Swelling     Oragel-localized swelling       Current Outpatient Medications   Medication Sig Dispense Refill    ferrous sulfate (IRON 325) 325 (65 Fe) MG tablet Take 1 tablet by mouth 3 times daily (with meals) 90 tablet 0    docusate sodium (COLACE) 100 MG capsule Take 1 capsule by mouth 2 times daily as needed for Constipation 60 capsule 0     No current facility-administered medications for this visit.        Social History     Socioeconomic History    Marital status: Single     Spouse name: Not on file    Number of children: Not on file    Years of education: Not on file    Highest education level: Not on file   Occupational History    Not on file   Tobacco Use    Smoking status: Never Smoker    Smokeless tobacco: Never Used   Vaping Use    Vaping Use: Never used   Substance and Sexual Activity    Alcohol use: Not Currently    Drug use: Not Currently    Sexual activity: Not on file   Other Topics Concern    Not on file   Social History Narrative    Occasional soda intake. rare tea      Social Determinants of Health     Financial Resource Strain: High Risk    Difficulty of Paying Living Expenses: Hard   Food Insecurity: No Food Insecurity    Worried About Running Out of Food in the Last Year: Never true    Home of Food in the Last Year: Never true   Transportation Needs:     Lack of Transportation (Medical): Not on file    Lack of Transportation (Non-Medical): Not on file   Physical Activity:     Days of Exercise per Week: Not on file    Minutes of Exercise per Session: Not on file   Stress:     Feeling of Stress : Not on file   Social Connections:     Frequency of Communication with Friends and Family: Not on file    Frequency of Social Gatherings with Friends and Family: Not on file    Attends Mandaeism Services: Not on file    Active Member of [address] [place] [address] or Organizations: Not on file    Attends Club or Organization Meetings: Not on file    Marital Status: Not on file   Intimate Partner Violence:     Fear of Current or Ex-Partner: Not on file    Emotionally Abused: Not on file    Physically Abused: Not on file    Sexually Abused: Not on file   Housing Stability:     Unable to Pay for Housing in the Last Year: Not on file    Number of [place] in the Last Year: Not on file    Unstable Housing in the Last Year: Not on file       No family history on file. Review of Systems  Constitutional: Negative for fever, malaise/fatigue and weight loss. HENT: Negative for sore throat and tinnitus.     Eyes: Negative for blurred vision and double vision. Respiratory: Negative for shortness of breath. Negative for cough and wheezing. Cardiovascular: As mentioned in HPI. Gastrointestinal: Negative for abdominal pain, heartburn, nausea and vomiting. Genitourinary: Negative. Musculoskeletal: Negative for back pain, joint pain and myalgias. Neurological: Negative for dizziness, tremors, loss of consciousness and headaches. Endo/Heme/Allergies: Negative. Psychiatric/Behavioral: Negative for depression and suicidal ideas. Physical Exam   /76 (Site: Left Upper Arm, Position: Sitting, Cuff Size: Medium Adult)   Pulse 98   Resp 16   Ht 5' 2\" (1.575 m)   Wt 111 lb 3.2 oz (50.4 kg)   SpO2 98%   BMI 20.34 kg/m²   Constitutional: Oriented to person, place, and time. Well-developed and well-nourished. No distress. Head: Normocephalic and atraumatic. Eyes: EOM are normal. Pupils are equal, round, and reactive to light. Neck: Normal range of motion. Neck supple. No hepatojugular reflux and no JVD present. Carotid bruit is not present. No tracheal deviation present. No thyromegaly present. Cardiovascular: Normal rate, regular rhythm, normal heart sounds and intact distal pulses. Exam reveals no gallop and no friction rub. No murmur heard. Pulmonary/Chest: Effort normal and breath sounds normal. No respiratory distress. No wheezes. No rales. No tenderness. Abdominal: Soft. Bowel sounds are normal. No distension and no mass. No tenderness. No rebound and no guarding. Musculoskeletal: Normal range of motion. No edema and no tenderness. Lymphadenopathy:   No cervical adenopathy. Neurological: Alert and oriented to person, place, and time. Skin: Skin is warm and dry. No rash noted. Not diaphoretic. No erythema. Psychiatric: Normal mood and affect. Behavior is normal.     Procedures and Testing  EKG: Done today and reviewed by me. Shows normal sinus rhythm. OR interval at 100 ms. Short OR. EKG from 10/25/2021 reviewed. While reports states junctional tachycardia there is evidence of P waves. He is to be sinus tachycardia. No other abnormality seen. ASSESSMENT:   Diagnosis Orders   1. Abnormal EKG  EKG 12 lead    ECHO Complete 2D W Doppler W Color   2. KO (dyspnea on exertion)          Plan:  1. Abnormal EKG, dyspnea on exertion: I believe her symptoms were mostly secondary to her significant anemia. She does have follow-up with a hematologist tomorrow. We will check an echocardiogram to assess biventricular function and to rule out any significant valvular abnormalities. EKG findings as above. 2.  Blood pressure is at goal in clinic today. Counseled about compliance with diet and exercise. She will follow up with me in the office as needed based on the echocardiogram results as well as for new or worsening symptoms. Sami Smith MD  [facility]) Cardiology     NOTE: This report was transcribed using voice recognition software. Every effort was made to ensure accuracy; however, inadvertent computerized transcription errors may be present.

## 2021-11-11 ENCOUNTER — HOSPITAL ENCOUNTER (OUTPATIENT)
Dept: INFUSION THERAPY | Age: 21
Discharge: HOME | End: 2021-11-11
Payer: COMMERCIAL

## 2021-11-11 ENCOUNTER — OFFICE VISIT (OUTPATIENT)
Dept: ONCOLOGY | Age: 21
End: 2021-11-11
Payer: COMMERCIAL

## 2021-11-11 VITALS
OXYGEN SATURATION: 98 % | HEIGHT: 62 IN | BODY MASS INDEX: 20.33 KG/M2 | WEIGHT: 110.5 LBS | SYSTOLIC BLOOD PRESSURE: 111 MMHG | HEART RATE: 102 BPM | TEMPERATURE: 98.1 F | DIASTOLIC BLOOD PRESSURE: 58 MMHG

## 2021-11-11 DIAGNOSIS — D50.9 IRON DEFICIENCY ANEMIA, UNSPECIFIED IRON DEFICIENCY ANEMIA TYPE: Primary | ICD-10-CM

## 2021-11-11 DIAGNOSIS — D50.9 IRON DEFICIENCY ANEMIA, UNSPECIFIED IRON DEFICIENCY ANEMIA TYPE: ICD-10-CM

## 2021-11-11 LAB
ACANTHOCYTES: ABNORMAL
ALBUMIN SERPL-MCNC: 4.6 G/DL (ref 3.5–5.2)
ALP BLD-CCNC: 57 U/L (ref 35–104)
ALT SERPL-CCNC: 16 U/L (ref 0–32)
ANION GAP SERPL CALCULATED.3IONS-SCNC: 9 MMOL/L (ref 7–16)
ANISOCYTOSIS: ABNORMAL
AST SERPL-CCNC: 19 U/L (ref 0–31)
ATYPICAL LYMPHOCYTE RELATIVE PERCENT: 0.9 % (ref 0–4)
BASOPHILS ABSOLUTE: 0.05 E9/L (ref 0–0.2)
BASOPHILS RELATIVE PERCENT: 0.9 % (ref 0–2)
BILIRUB SERPL-MCNC: 0.3 MG/DL (ref 0–1.2)
BUN BLDV-MCNC: 11 MG/DL (ref 6–20)
CALCIUM SERPL-MCNC: 9.6 MG/DL (ref 8.6–10.2)
CHLORIDE BLD-SCNC: 103 MMOL/L (ref 98–107)
CO2: 25 MMOL/L (ref 22–29)
CREAT SERPL-MCNC: 0.8 MG/DL (ref 0.5–1)
EOSINOPHILS ABSOLUTE: 0.1 E9/L (ref 0.05–0.5)
EOSINOPHILS RELATIVE PERCENT: 1.8 % (ref 0–6)
FERRITIN: 82 NG/ML
GFR AFRICAN AMERICAN: >60
GFR NON-AFRICAN AMERICAN: >60 ML/MIN/1.73
GLUCOSE BLD-MCNC: 112 MG/DL (ref 74–99)
HAPTOGLOBIN: 66 MG/DL (ref 30–200)
HCT VFR BLD CALC: 35.3 % (ref 34–48)
HEMOGLOBIN: 9.9 G/DL (ref 11.5–15.5)
HYPOCHROMIA: ABNORMAL
IMMATURE RETIC FRACT: 16.5 % (ref 3–15.9)
IRON SATURATION: 49 % (ref 15–50)
IRON: 176 MCG/DL (ref 37–145)
LYMPHOCYTES ABSOLUTE: 0.97 E9/L (ref 1.5–4)
LYMPHOCYTES RELATIVE PERCENT: 17.5 % (ref 20–42)
MCH RBC QN AUTO: 19.1 PG (ref 26–35)
MCHC RBC AUTO-ENTMCNC: 28 % (ref 32–34.5)
MCV RBC AUTO: 68.1 FL (ref 80–99.9)
MONOCYTES ABSOLUTE: 0.22 E9/L (ref 0.1–0.95)
MONOCYTES RELATIVE PERCENT: 3.5 % (ref 2–12)
NEUTROPHILS ABSOLUTE: 4.05 E9/L (ref 1.8–7.3)
NEUTROPHILS RELATIVE PERCENT: 75.4 % (ref 43–80)
OVALOCYTES: ABNORMAL
PDW BLD-RTO: ABNORMAL FL (ref 11.5–15)
PLATELET # BLD: 363 E9/L (ref 130–450)
PMV BLD AUTO: ABNORMAL FL (ref 7–12)
POIKILOCYTES: ABNORMAL
POLYCHROMASIA: ABNORMAL
POTASSIUM SERPL-SCNC: 4.2 MMOL/L (ref 3.5–5)
RBC # BLD: 5.18 E12/L (ref 3.5–5.5)
RETIC HGB EQUIVALENT: 29.6 PG (ref 28.2–36.6)
RETICULOCYTE ABSOLUTE COUNT: 0.06 E12/L
RETICULOCYTE COUNT PCT: 1.2 % (ref 0.4–1.9)
SCHISTOCYTES: ABNORMAL
SODIUM BLD-SCNC: 137 MMOL/L (ref 132–146)
TARGET CELLS: ABNORMAL
TEAR DROP CELLS: ABNORMAL
TOTAL IRON BINDING CAPACITY: 357 MCG/DL (ref 250–450)
TOTAL PROTEIN: 7.8 G/DL (ref 6.4–8.3)
WBC # BLD: 5.4 E9/L (ref 4.5–11.5)

## 2021-11-11 PROCEDURE — 36415 COLL VENOUS BLD VENIPUNCTURE: CPT

## 2021-11-11 PROCEDURE — 99214 OFFICE O/P EST MOD 30 MIN: CPT

## 2021-11-11 PROCEDURE — 99245 OFF/OP CONSLTJ NEW/EST HI 55: CPT | Performed by: INTERNAL MEDICINE

## 2021-11-11 NOTE — PROGRESS NOTES
Department of [facility] Oncology  Attending Consult Note    Reason for Visit: Consultation on a patient with Iron deficiency anemia    Referring Physician: Haley Gustafson MD    PCP:  Haley Gustafson MD    History of Present Illness:  23 y/o female with hx of constipation who was noted to have microcytic anemia on routine blood work. On 10/26/2021: Hb 5.9  Hct 17.7  MCV 53.1    WBC 4.8  Peripheral blood smear:   Microcytic hypochromic anemia with polychromasia, suggestive of iron deficiency. Unremarkable platelets and white blood cells  Retic 1.3 (0.4-1.9%)  Fe 10 TIBC 461 FeSat 2%  Transferrin 391 (200-360)  Ferritin 3  Given 1 unit prbc 10/26/2021  On 10/28/2021: Hb 7.8  Hct 29.1  MCV 58.3    WBC 4.0  Hb 8.5 on 11/03/2021. She has been on FeSO4 325 mg po TID for the last 2.5 weeks with improvement in her energy level. Review of Systems;  CONSTITUTIONAL: No fever, chills. Fair appetite. Improvement in her energy level on oral FeSO4  ENMT: Eyes: No diplopia; Nose: No epistaxis. Mouth: No sore throat. RESPIRATORY: No hemoptysis, shortness of breath, cough. CARDIOVASCULAR: No chest pain, palpitations. GASTROINTESTINAL: + constipation  GENITOURINARY: No dysuria, urinary frequency, hematuria. + heavy menstrual bleeding  NEURO: No syncope, presyncope, headache. Remainder:  ROS NEGATIVE    Past Medical History:      Diagnosis Date    Constipation      Past Surgical History:      Procedure Laterality Date    TONSILLECTOMY      TONSILLECTOMY  2012     Family History:  No family history on file. Medications:  Reviewed and reconciled.     Social History:  Social History     Socioeconomic History    Marital status: Single     Spouse name: Not on file    Number of children: Not on file    Years of education: Not on file    Highest education level: Not on file   Occupational History    Not on file   Tobacco Use    Smoking status: Never Smoker    Smokeless tobacco: Never Used Vaping Use    Vaping Use: Never used   Substance and Sexual Activity    Alcohol use: Not Currently    Drug use: Not Currently    Sexual activity: Not on file   Other Topics Concern    Not on file   Social History Narrative    Occasional soda intake. rare tea      Social Determinants of Health     Financial Resource Strain: High Risk    Difficulty of Paying Living Expenses: Hard   Food Insecurity: No Food Insecurity    Worried About Running Out of Food in the Last Year: Never true    Home of Food in the Last Year: Never true   Transportation Needs:     Lack of Transportation (Medical): Not on file    Lack of Transportation (Non-Medical): Not on file   Physical Activity:     Days of Exercise per Week: Not on file    Minutes of Exercise per Session: Not on file   Stress:     Feeling of Stress : Not on file   Social Connections:     Frequency of Communication with Friends and Family: Not on file    Frequency of Social Gatherings with Friends and Family: Not on file    Attends Restorationist Services: Not on file    Active Member of [address] [place] [address] or Organizations: Not on file    Attends Club or Organization Meetings: Not on file    Marital Status: Not on file   Intimate Partner Violence:     Fear of Current or Ex-Partner: Not on file    Emotionally Abused: Not on file    Physically Abused: Not on file    Sexually Abused: Not on file   Housing Stability:     Unable to Pay for Housing in the Last Year: Not on file    Number of [place] in the Last Year: Not on file    Unstable Housing in the Last Year: Not on file     Allergies: Allergies   Allergen Reactions    Other Swelling     Oragel-localized swelling     Physical Exam:  BP (!) 111/58   Pulse 102   Temp 98.1 °F (36.7 °C)   Ht 5' 2\" (1.575 m)   Wt 110 lb 8 oz (50.1 kg)   SpO2 98%   BMI 20.21 kg/m²   GENERAL: Alert, oriented x 3, tired. HEENT: PERRLA; EOMI. Oropharynx clear. NECK: Supple. Without lymphadenopathy.    LUNGS: Good air entry bilaterally. No wheezing, crackles or ronchi. CARDIOVASCULAR: Regular rate. No murmurs, rubs or gallops. ABDOMEN: Soft. Non-tender, non-distended. Positive bowel sounds. Marysol Raddle EXTREMITIES: Without clubbing, cyanosis, or edema. NEUROLOGIC: No focal deficits. Lab Results   Component Value Date    WBC 4.0 (L) 10/28/2021    HGB 8.5 11/03/2021    HCT 29.1 (L) 10/28/2021    MCV 58.3 (L) 10/28/2021     10/28/2021     Lab Results   Component Value Date     10/26/2021    K 3.9 10/26/2021     10/26/2021    CO2 24 10/26/2021    BUN 11 10/26/2021    CREATININE 0.6 10/26/2021    GLUCOSE 97 10/26/2021    CALCIUM 9.7 10/26/2021      Lab Results   Component Value Date    IRON 10 (L) 10/25/2021    TIBC 461 (H) 10/25/2021    FERRITIN 3 10/26/2021     Impression/Plan:  23 y/o female with hx of constipation who was noted to have microcytic anemia on routine blood work. On 10/26/2021: Hb 5.9  Hct 17.7  MCV 53.1    WBC 4.8  Peripheral blood smear:   Microcytic hypochromic anemia with polychromasia, suggestive of iron deficiency. Unremarkable platelets and white blood cells  Retic 1.3 (0.4-1.9%)  Fe 10 TIBC 461 FeSat 2%  Transferrin 391 (200-360)  Ferritin 3  Given 1 unit prbc 10/26/2021  On 10/28/2021: Hb 7.8  Hct 29.1  MCV 58.3    WBC 4.0  Hb 8.5 on 11/03/2021. She has been on FeSO4 325 mg po TID for the last 2.5 weeks with improvement in her energy level. Extensive blood work ordered and drawn today 11/11/2021  RTC 2-3 weeks to review test results and possible Feraheme for iron deficiency anemia. GI team consulted given iron deficiency anemia and constipation  GYN team consulted given heavy menstrual periods    Thank you for allowing us to participate in the care of   Rashmi Tao MD   11/11/2021

## 2021-11-11 NOTE — PROGRESS NOTES
Lissette Schuler  2000 22 y.o. Referring Physician:     PCP: Akosua Limon MD    Vitals:    21 1505   BP: (!) 111/58   Pulse: 102   Temp: 98.1 °F (36.7 °C)   SpO2: 98%        Wt Readings from Last 3 Encounters:   21 110 lb 8 oz (50.1 kg)   11/10/21 111 lb 3.2 oz (50.4 kg)   21 112 lb (50.8 kg)        Body mass index is 20.21 kg/m². Chief Complaint:   Chief Complaint   Patient presents with    New Patient     ADAMARIS           LMP: 2021    Age at first Menses: 16    :     Para:           Current Outpatient Medications:     ferrous sulfate (IRON 325) 325 (65 Fe) MG tablet, Take 1 tablet by mouth 3 times daily (with meals), Disp: 90 tablet, Rfl: 0    docusate sodium (COLACE) 100 MG capsule, Take 1 capsule by mouth 2 times daily as needed for Constipation, Disp: 60 capsule, Rfl: 0       Past Medical History:   Diagnosis Date    Constipation        Past Surgical History:   Procedure Laterality Date    TONSILLECTOMY      TONSILLECTOMY  2012       History reviewed. No pertinent family history. Social History     Socioeconomic History    Marital status: Single     Spouse name: Not on file    Number of children: Not on file    Years of education: Not on file    Highest education level: Not on file   Occupational History    Not on file   Tobacco Use    Smoking status: Never Smoker    Smokeless tobacco: Never Used   Vaping Use    Vaping Use: Never used   Substance and Sexual Activity    Alcohol use: Not Currently    Drug use: Not Currently    Sexual activity: Not on file   Other Topics Concern    Not on file   Social History Narrative    Occasional soda intake.  rare tea      Social Determinants of Health     Financial Resource Strain: High Risk    Difficulty of Paying Living Expenses: Hard   Food Insecurity: No Food Insecurity    Worried About Running Out of Food in the Last Year: Never true    Home of Food in the Last Year: Never true Transportation Needs:     Lack of Transportation (Medical): Not on file    Lack of Transportation (Non-Medical): Not on file   Physical Activity:     Days of Exercise per Week: Not on file    Minutes of Exercise per Session: Not on file   Stress:     Feeling of Stress : Not on file   Social Connections:     Frequency of Communication with Friends and Family: Not on file    Frequency of Social Gatherings with Friends and Family: Not on file    Attends [employer] Services: Not on file    Active Member of [address] [place] [address] or Organizations: Not on file    Attends Club or Organization Meetings: Not on file    Marital Status: Not on file   Intimate Partner Violence:     Fear of Current or Ex-Partner: Not on file    Emotionally Abused: Not on file    Physically Abused: Not on file    Sexually Abused: Not on file   Housing Stability:     Unable to Pay for Housing in the Last Year: Not on file    Number of [place] in the Last Year: Not on file    Unstable Housing in the Last Year: Not on file           Occupation: full time student  Retired:  NO          REVIEW OF SYSTEMS: <<For Level 5, 10 or more systems>>     Pacemaker/Defibulator/ICD:  No    Mediport: No           FALLS RISK SCREENING ASSESSMENT    Instructions:  Assess the patient and [place] the appropriate indicators that are present for fall risk identification. Total the numbers circled and assign a fall risk score from Table 2.  Reassess patient at a minimum every 12 weeks or with status change. Assessment   Date  11/11/2021     1. Mental Ability: confusion/cognitively impaired No - 0       2. Elimination Issues: incontinence, frequency No - 0       3. Ambulatory: use of assistive devices (walker, cane, off-loading devices), attached to equipment (IV pole, oxygen) No - 0     4. Sensory Limitations: dizziness, vertigo, impaired vision No - 0       5. Age Less than 65 years - 0       6.   Medication: diuretics, strong analgesics, hypnotics, sedatives, antihypertensive agents   No - 0   7. Falls:  recent history of falls within the last 3 months (not to include slipping or tripping)   No - 0   TOTAL 0    If score of 4 or greater was education given? No       TABLE 2   Risk Score Risk Level Plan of Care   0-3 Little or  No Risk 1. Provide assistance as indicated for ambulation activities  2. Reorient confused/cognitively impaired patient  3. Call-light/bell within patient's reach  4. Chair/bed in low position, stretcher/bed with siderails up except when performing patient care activities  5. Educate patient/family/caregiver on falls prevention  6.  Reassess in 12 weeks or with any noted change in patient condition which places them at a risk for a fall   4-6 Moderate Risk 1. Provide assistance as indicated for ambulation activities  2. Reorient confused/cognitively impaired patient  3. Call-light/bell within patient's reach  4. Chair/bed in low position, stretcher/bed with siderails up except when performing patient care activities  5. Educate patient/family/caregiver on falls prevention  6. Falls risk precaution (Yellow sticker Level II) placed on patient chart   7 or   Higher High Risk 1. Place patient in easily observable treatment room  2. Patient attended at all times by family member or staff  3. Provide assistance as indicated for ambulation activities  4. Reorient confused/cognitively impaired patient  5. Call-light/bell within patient's reach  6. Chair/bed in low position, stretcher/bed with siderails up except when performing patient care activities  7. Educate patient/family/caregiver on falls prevention  8.   Falls risk precaution (Yellow sticker Level III) placed on patient chart                     Derrell Beavers RN

## 2021-11-12 ENCOUNTER — TELEPHONE (OUTPATIENT)
Dept: PHARMACY | Age: 21
End: 2021-11-12

## 2021-11-12 LAB
ALBUMIN SERPL-MCNC: 3.9 G/DL (ref 3.5–4.7)
ALPHA-1-GLOBULIN: 0.3 G/DL (ref 0.2–0.4)
ALPHA-2-GLOBULIN: 0.7 G/FL (ref 0.5–1)
BETA GLOBULIN: 1.1 G/DL (ref 0.8–1.3)
DAT POLYSPECIFIC: NORMAL
ELECTROPHORESIS: NORMAL
GAMMA GLOBULIN: 1.6 G/DL (ref 0.7–1.6)
IMMUNOFIXATION RESULT, SERUM: NORMAL
LACTATE DEHYDROGENASE: 212 U/L (ref 135–214)
PATHOLOGIST REVIEW: NORMAL
SICKLE CELL SCREEN: POSITIVE

## 2021-11-12 NOTE — TELEPHONE ENCOUNTER
Received a referral from Mary Gordillo to see if I could find some assistance for Feraheme. Patients insurance will only cover the cost of the administration of the med and not cover the cost of the med. Pulled an application for patient assistance for Feraheme and called patient to come in to sign paperwork and also bring in her financial paperwork. Chad Patel stated she was at work and cannot make any financial decisions without her sister. Asked if I could call her back on Monday, November 15, 2021 at 8:30am to speak to her and her sister about patient assistance for [facility]. Told her I will call her Monday morning. Information was forwarded to Mary Gordillo.

## 2021-11-15 ENCOUNTER — TELEPHONE (OUTPATIENT)
Dept: PRIMARY CARE CLINIC | Age: 21
End: 2021-11-15

## 2021-11-15 LAB — ERYTHROPOIETIN: 13 MU/ML (ref 4–27)

## 2021-11-16 ENCOUNTER — TELEPHONE (OUTPATIENT)
Dept: PHARMACY | Age: 21
End: 2021-11-16

## 2021-11-16 LAB
Lab: NORMAL
REPORT: NORMAL
THIS TEST SENT TO: NORMAL

## 2021-11-16 NOTE — TELEPHONE ENCOUNTER
Placed a call to Loretta Hanna this morning to set up a time and date for her to come in an sign application for assistance of her Feraheme. She is to come in Thursday 11- at 11:30 a.m. Loretta Hanna does have my contact information. Will proceed with application 76-.

## 2021-11-17 LAB
COPPER: 105.5 UG/DL (ref 80–155)
ZINC: 60.3 UG/DL (ref 60–120)

## 2021-11-18 LAB — VITAMIN B6: 47.9 NMOL/L (ref 20–125)

## 2021-11-21 LAB
Lab: NORMAL
REPORT: NORMAL
THIS TEST SENT TO: NORMAL

## 2021-11-23 ENCOUNTER — TELEPHONE (OUTPATIENT)
Dept: INFUSION THERAPY | Age: 21
End: 2021-11-23

## 2021-11-23 NOTE — TELEPHONE ENCOUNTER
Per Shefali Pedersen note per [address] when she spoke with them, patient would not be approved for Feraheme due to being on a student visa from the [employer] would not cover the feraheme but would cover the administration cost if it was deeded medically necessary. Spoke to Gladys Culver from [facility] who states that patient is covered by [address] and that her Co pay would be $20 for both Feraheme and the administration. Informed Gladys Culver of what Deepthi Iyer Dr. spoke to Naa Brooks about and Gladys Culver states that they will call [employer] back to see what exactly is going on. Spoke to Abdulaziz Marti from [facility]. Es because if patient is approved for free Feraheme it will not be in time for her treatment date. I asked that her appt be pushed back until we can figure this out.

## 2021-11-25 LAB
JAK2 QUAL MUTATION BY PCR: NOT DETECTED
SOURCE: NORMAL

## 2021-11-29 LAB
Lab: NORMAL
REPORT: NORMAL
THIS TEST SENT TO: NORMAL

## 2021-12-01 ENCOUNTER — TELEPHONE (OUTPATIENT)
Dept: INFUSION THERAPY | Age: 21
End: 2021-12-01

## 2021-12-01 NOTE — TELEPHONE ENCOUNTER
Called and spoke with patient regarding $20 copay for each feraheme infusion. Patient was agreeable to this and would be scheduled for both infusions.

## 2021-12-07 ASSESSMENT — ENCOUNTER SYMPTOMS
WHEEZING: 0
DIARRHEA: 0
VOMITING: 0
COUGH: 0
CONSTIPATION: 0
ABDOMINAL PAIN: 0
SHORTNESS OF BREATH: 0
NAUSEA: 0

## 2021-12-08 ENCOUNTER — OFFICE VISIT (OUTPATIENT)
Dept: PRIMARY CARE CLINIC | Age: 21
End: 2021-12-08

## 2021-12-08 ENCOUNTER — HOSPITAL ENCOUNTER (OUTPATIENT)
Dept: INFUSION THERAPY | Age: 21
Discharge: HOME | End: 2021-12-08
Payer: COMMERCIAL

## 2021-12-08 VITALS
SYSTOLIC BLOOD PRESSURE: 96 MMHG | HEART RATE: 88 BPM | OXYGEN SATURATION: 99 % | DIASTOLIC BLOOD PRESSURE: 56 MMHG | TEMPERATURE: 97.8 F

## 2021-12-08 VITALS
HEIGHT: 62 IN | SYSTOLIC BLOOD PRESSURE: 125 MMHG | OXYGEN SATURATION: 100 % | HEART RATE: 86 BPM | TEMPERATURE: 96.8 F | BODY MASS INDEX: 20.24 KG/M2 | DIASTOLIC BLOOD PRESSURE: 74 MMHG | WEIGHT: 110 LBS

## 2021-12-08 DIAGNOSIS — N92.6 IRREGULAR PERIODS: ICD-10-CM

## 2021-12-08 DIAGNOSIS — D50.9 IRON DEFICIENCY ANEMIA, UNSPECIFIED IRON DEFICIENCY ANEMIA TYPE: Primary | ICD-10-CM

## 2021-12-08 DIAGNOSIS — K59.00 CONSTIPATION, UNSPECIFIED CONSTIPATION TYPE: ICD-10-CM

## 2021-12-08 DIAGNOSIS — R94.31 SHORTENED PR INTERVAL: ICD-10-CM

## 2021-12-08 LAB — HGB, POC: 9.9

## 2021-12-08 PROCEDURE — 2580000003 HC RX 258: Performed by: INTERNAL MEDICINE

## 2021-12-08 PROCEDURE — 85018 HEMOGLOBIN: CPT | Performed by: FAMILY MEDICINE

## 2021-12-08 PROCEDURE — 6360000002 HC RX W HCPCS: Performed by: INTERNAL MEDICINE

## 2021-12-08 PROCEDURE — 99214 OFFICE O/P EST MOD 30 MIN: CPT | Performed by: FAMILY MEDICINE

## 2021-12-08 PROCEDURE — 96365 THER/PROPH/DIAG IV INF INIT: CPT

## 2021-12-08 RX ORDER — METHYLPREDNISOLONE SODIUM SUCCINATE 125 MG/2ML
125 INJECTION, POWDER, LYOPHILIZED, FOR SOLUTION INTRAMUSCULAR; INTRAVENOUS ONCE
OUTPATIENT
Start: 2021-12-15 | End: 2021-12-15

## 2021-12-08 RX ORDER — FERROUS SULFATE 325(65) MG
325 TABLET ORAL
Qty: 90 TABLET | Refills: 0 | Status: CANCELLED | OUTPATIENT
Start: 2021-12-08

## 2021-12-08 RX ORDER — EPINEPHRINE 1 MG/ML
0.3 INJECTION, SOLUTION, CONCENTRATE INTRAVENOUS PRN
Status: CANCELLED | OUTPATIENT
Start: 2021-12-08

## 2021-12-08 RX ORDER — SODIUM CHLORIDE 0.9 % (FLUSH) 0.9 %
5-40 SYRINGE (ML) INJECTION PRN
Status: CANCELLED | OUTPATIENT
Start: 2021-12-08

## 2021-12-08 RX ORDER — DOCUSATE SODIUM 100 MG/1
100 CAPSULE, LIQUID FILLED ORAL 2 TIMES DAILY
Qty: 60 CAPSULE | Refills: 0 | Status: SHIPPED | OUTPATIENT
Start: 2021-12-08 | End: 2022-01-07

## 2021-12-08 RX ORDER — SODIUM CHLORIDE 9 MG/ML
INJECTION, SOLUTION INTRAVENOUS CONTINUOUS
Status: CANCELLED | OUTPATIENT
Start: 2021-12-08

## 2021-12-08 RX ORDER — HEPARIN SODIUM (PORCINE) LOCK FLUSH IV SOLN 100 UNIT/ML 100 UNIT/ML
500 SOLUTION INTRAVENOUS PRN
OUTPATIENT
Start: 2021-12-15

## 2021-12-08 RX ORDER — EPINEPHRINE 1 MG/ML
0.3 INJECTION, SOLUTION, CONCENTRATE INTRAVENOUS PRN
OUTPATIENT
Start: 2021-12-15

## 2021-12-08 RX ORDER — METHYLPREDNISOLONE SODIUM SUCCINATE 125 MG/2ML
125 INJECTION, POWDER, LYOPHILIZED, FOR SOLUTION INTRAMUSCULAR; INTRAVENOUS ONCE
Status: CANCELLED | OUTPATIENT
Start: 2021-12-08 | End: 2021-12-08

## 2021-12-08 RX ORDER — SODIUM CHLORIDE 9 MG/ML
25 INJECTION, SOLUTION INTRAVENOUS PRN
OUTPATIENT
Start: 2021-12-15

## 2021-12-08 RX ORDER — SODIUM CHLORIDE 0.9 % (FLUSH) 0.9 %
5-40 SYRINGE (ML) INJECTION PRN
OUTPATIENT
Start: 2021-12-15

## 2021-12-08 RX ORDER — SODIUM CHLORIDE 9 MG/ML
INJECTION, SOLUTION INTRAVENOUS CONTINUOUS
OUTPATIENT
Start: 2021-12-15

## 2021-12-08 RX ORDER — DIPHENHYDRAMINE HYDROCHLORIDE 50 MG/ML
50 INJECTION INTRAMUSCULAR; INTRAVENOUS ONCE
OUTPATIENT
Start: 2021-12-15 | End: 2021-12-15

## 2021-12-08 RX ORDER — HEPARIN SODIUM (PORCINE) LOCK FLUSH IV SOLN 100 UNIT/ML 100 UNIT/ML
500 SOLUTION INTRAVENOUS PRN
Status: CANCELLED | OUTPATIENT
Start: 2021-12-08

## 2021-12-08 RX ORDER — SODIUM CHLORIDE 9 MG/ML
INJECTION, SOLUTION INTRAVENOUS CONTINUOUS
Status: DISCONTINUED | OUTPATIENT
Start: 2021-12-08 | End: 2021-12-09 | Stop reason: HOSPADM

## 2021-12-08 RX ORDER — DIPHENHYDRAMINE HYDROCHLORIDE 50 MG/ML
50 INJECTION INTRAMUSCULAR; INTRAVENOUS ONCE
Status: CANCELLED | OUTPATIENT
Start: 2021-12-08 | End: 2021-12-08

## 2021-12-08 RX ORDER — SODIUM CHLORIDE 9 MG/ML
25 INJECTION, SOLUTION INTRAVENOUS PRN
Status: CANCELLED | OUTPATIENT
Start: 2021-12-08

## 2021-12-08 RX ADMIN — FERUMOXYTOL 510 MG: 510 INJECTION INTRAVENOUS at 14:28

## 2021-12-08 RX ADMIN — SODIUM CHLORIDE: 9 INJECTION, SOLUTION INTRAVENOUS at 14:18

## 2021-12-08 ASSESSMENT — ENCOUNTER SYMPTOMS
ABDOMINAL PAIN: 0
SHORTNESS OF BREATH: 0
WHEEZING: 0
DIARRHEA: 0
COUGH: 0
VOMITING: 0
NAUSEA: 0
CONSTIPATION: 1
BLOOD IN STOOL: 0

## 2021-12-08 NOTE — PROGRESS NOTES
distress. Appearance: She is well-developed. She is not diaphoretic. HENT:      Head: Normocephalic and atraumatic. Eyes:      Conjunctiva/sclera: Conjunctivae normal.      Pupils: Pupils are equal, round, and reactive to light. Cardiovascular:      Rate and Rhythm: Normal rate and regular rhythm. Pulmonary:      Effort: Pulmonary effort is normal.      Breath sounds: Normal breath sounds. Abdominal:      General: Bowel sounds are normal. There is no distension. Palpations: Abdomen is soft. Tenderness: There is no abdominal tenderness. Hernia: No hernia is present. Musculoskeletal:      Cervical back: Normal range of motion and neck supple. Skin:     General: Skin is warm and dry. Neurological:      Mental Status: She is alert and oriented to person, place, and time. ASSESSMENT/PLAN:  1. Iron deficiency anemia, unspecified iron deficiency anemia type  Poc Hgb 9.9 today. Stable. She reports going for her Iron infusion today and has a follow up with Hematology on 12/15 to review lab work up from her last appointment.   - POCT hemoglobin    2. Constipation, unspecified constipation type  Patient reports going 2x a week with hard bm. She has increased hydration and not seen any improvement. Will add Colace to regimen. Apt with GI scheduled 12/20.  - docusate sodium (COLACE) 100 MG capsule; Take 1 capsule by mouth 2 times daily  Dispense: 60 capsule; Refill: 0    3. Shortened HI interval  With sinus tachycardia. Patient saw Cardiology on 11/10. [place] secondary to #1. Echo pending. 4. Irregular periods  Patient has an appointment to establish with Gyn tomorrow. Lmp 12/1/21 and reportedly lasted 6 days. Patient states changing her pad bid. I have reviewed my findings and recommendations with Sami Rodriguez MD  12/8/2021 11:30 AM  No follow-ups on file.      Counseled regarding above diagnosis, including possible risks and complications, especially if left uncontrolled. Patient counseled on red flag symptoms and if they occur to go to the ED. Discussed medications risk/benefits and possible side effects and alternatives to treatment. Patient and/or guardian verbalizes understanding, agrees, feels comfortable with and wishes to proceed with above treatment plan. Advised patient regarding importance of keeping up with recommended health maintenance and to schedule as soon as possible if overdue, as this is important in assessing for undiagnosed pathology, especially cancer, as well as protecting against potentially harmful/life threatening disease. Patient and/or guardian verbalizes understanding and agrees with above counseling, assessment and plan. All questions answered. Please note this report has been partially produced using speech recognition software  and may contain errors related to that system including grammar, punctuation and spelling as well as words and phrases that may seem inappropriate. If there are questions or concerns please feel free to contact me to clarify.

## 2021-12-15 ENCOUNTER — OFFICE VISIT (OUTPATIENT)
Dept: ONCOLOGY | Age: 21
End: 2021-12-15
Payer: COMMERCIAL

## 2021-12-15 ENCOUNTER — HOSPITAL ENCOUNTER (OUTPATIENT)
Dept: INFUSION THERAPY | Age: 21
Discharge: HOME | End: 2021-12-15

## 2021-12-15 VITALS
WEIGHT: 112 LBS | TEMPERATURE: 98.8 F | OXYGEN SATURATION: 98 % | HEART RATE: 92 BPM | HEIGHT: 62 IN | DIASTOLIC BLOOD PRESSURE: 67 MMHG | BODY MASS INDEX: 20.61 KG/M2 | SYSTOLIC BLOOD PRESSURE: 121 MMHG

## 2021-12-15 DIAGNOSIS — D56.0 ALPHA THALASSEMIA (HCC): Primary | ICD-10-CM

## 2021-12-15 PROCEDURE — 99214 OFFICE O/P EST MOD 30 MIN: CPT | Performed by: INTERNAL MEDICINE

## 2021-12-15 PROCEDURE — 99213 OFFICE O/P EST LOW 20 MIN: CPT

## 2021-12-15 RX ORDER — FERROUS SULFATE 325(65) MG
325 TABLET ORAL 2 TIMES DAILY
Qty: 60 TABLET | Refills: 0 | Status: SHIPPED
Start: 2021-12-15 | End: 2022-01-10

## 2021-12-15 NOTE — PROGRESS NOTES
Department of [facility] Oncology  Attending Clinic Note    Reason for Visit: Follow-up on a patient with Iron deficiency anemia and alpha Thalassemia    PCP:  Marie Cyr MD    History of Present Illness:  23 y/o female with hx of constipation who was noted to have microcytic anemia on routine blood work. On 10/26/2021: Hb 5.9  Hct 17.7  MCV 53.1    WBC 4.8  Peripheral blood smear:   Microcytic hypochromic anemia with polychromasia, suggestive of iron deficiency. Unremarkable platelets and white blood cells  Retic 1.3 (0.4-1.9%)  Fe 10 TIBC 461 FeSat 2%  Transferrin 391 (200-360)  Ferritin 3  Given 1 unit prbc 10/26/2021  On 10/28/2021: Hb 7.8  Hct 29.1  MCV 58.3    WBC 4.0  Hb 8.5 on 11/03/2021. She has been on FeSO4 325 mg po TID with improvement in her energy level. On 11/11/2021: Hb 9.9  Hct 35.3  MCV 68.1  WBC 5.4    Peripheral blood smear:  Microcytic anemia with absolute red cell count within normal range. Dimorphic population of red cells noted, which may indicate recent red blood cell transfusion. Marked anisocytosis is present, with moderate poikilocytosis and hypochromia.    Rare schistocytes, target cells, and teardrop cells are all present. Negative for rouleaux formation. White blood cells and platelets (counts and morphology) are within normal limits. BUN 11 Creat 0.8  LFTs wnl  Fe 176 TIBC 357 FeSat 49% Ferritin 82   Epo 13 (4-27)  Hemolytic work-up Negative  Zinc, VitB6, Copper WNL  SPEP: Normal. No monoclonal protein identified  SIFE: Normal. No monoclonal protein identified.      Sickle cell screen: Positive  Hemoglobin electrophoresis: Alpha (+) thalassemia (silent carrier) state (-a/aa)  Genetic counseling    Peripheral blood flow cytometry noted no immunophenotypic evidence of acute leukemia or a T-cell or B-cell neoplasm  Peripheral blood MDS FISH: Negative Study  JAK2 Mutation Not detected    Microcytic anemia combination of iron deficiency anemia and alpha thalassemia    Review of Systems;  CONSTITUTIONAL: No fever, chills. Fair appetite and energy level  ENMT: Eyes: No diplopia; Nose: No epistaxis. Mouth: No sore throat. RESPIRATORY: No hemoptysis, shortness of breath, cough. CARDIOVASCULAR: No chest pain, palpitations. GASTROINTESTINAL: + constipation  GENITOURINARY: No dysuria, urinary frequency, hematuria. + heavy menstrual bleeding  NEURO: No syncope, presyncope, headache. Remainder:  ROS NEGATIVE    Past Medical History:      Diagnosis Date    Anemia     Constipation     History of blood transfusion      Medications:  Reviewed and reconciled. Allergies: Allergies   Allergen Reactions    Other Swelling     Oragel-localized swelling     Physical Exam:  /67   Pulse 92   Temp 98.8 °F (37.1 °C)   Ht 5' 2\" (1.575 m)   Wt 112 lb (50.8 kg)   SpO2 98%   BMI 20.49 kg/m²   GENERAL: Alert, oriented x 3, tired. HEENT: PERRLA; EOMI. Oropharynx clear. NECK: Supple. Without lymphadenopathy. LUNGS: Good air entry bilaterally. No wheezing, crackles or ronchi. CARDIOVASCULAR: Regular rate. No murmurs, rubs or gallops. ABDOMEN: Soft. Non-tender, non-distended. Positive bowel sounds. Marian Víctor EXTREMITIES: Without clubbing, cyanosis, or edema. NEUROLOGIC: No focal deficits. Lab Results   Component Value Date    WBC 5.4 11/11/2021    HGB 9.9 12/08/2021    HCT 35.3 11/11/2021    MCV 68.1 (L) 11/11/2021     11/11/2021     Lab Results   Component Value Date     11/11/2021    K 4.2 11/11/2021     11/11/2021    CO2 25 11/11/2021    BUN 11 11/11/2021    CREATININE 0.8 11/11/2021    GLUCOSE 112 11/11/2021    CALCIUM 9.6 11/11/2021      Lab Results   Component Value Date    IRON 176 (H) 11/11/2021    TIBC 357 11/11/2021    FERRITIN 82 11/11/2021     Impression/Plan:  23 y/o female with hx of constipation who was noted to have microcytic anemia on routine blood work.    On 10/26/2021: Hb 5.9  Hct 17.7  MCV 53.1    WBC 4.8  Peripheral blood smear:   Microcytic hypochromic anemia with polychromasia, suggestive of iron deficiency. Unremarkable platelets and white blood cells  Retic 1.3 (0.4-1.9%)  Fe 10 TIBC 461 FeSat 2%  Transferrin 391 (200-360)  Ferritin 3  Given 1 unit prbc 10/26/2021  On 10/28/2021: Hb 7.8  Hct 29.1  MCV 58.3    WBC 4.0  Hb 8.5 on 11/03/2021. She has been on FeSO4 325 mg po TID with improvement in her energy level. On 11/11/2021: Hb 9.9  Hct 35.3  MCV 68.1  WBC 5.4    Peripheral blood smear:  Microcytic anemia with absolute red cell count within normal range. Dimorphic population of red cells noted, which may indicate recent red blood cell transfusion. Marked anisocytosis is present, with moderate poikilocytosis and hypochromia.    Rare schistocytes, target cells, and teardrop cells are all present. Negative for rouleaux formation. White blood cells and platelets (counts and morphology) are within normal limits. BUN 11 Creat 0.8  LFTs wnl  Fe 176 TIBC 357 FeSat 49% Ferritin 82   Epo 13 (4-27)  Hemolytic work-up Negative  Zinc, VitB6, Copper WNL  SPEP: Normal. No monoclonal protein identified  SIFE: Normal. No monoclonal protein identified.      Sickle cell screen: Positive  Hemoglobin electrophoresis: Alpha (+) thalassemia (silent carrier) state (-a/aa)  Genetic counseling    Peripheral blood flow cytometry noted no immunophenotypic evidence of acute leukemia or a T-cell or B-cell neoplasm  Peripheral blood MDS FISH: Negative Study  JAK2 Mutation Not detected    Microcytic anemia combination of iron deficiency anemia and alpha thalassemia  Change FeSO4 325 mg to bid    RTC 1 months with prior labs    Emanuel Knox MD   12/15/2021

## 2022-01-03 ENCOUNTER — NURSE ONLY (OUTPATIENT)
Dept: PRIMARY CARE CLINIC | Age: 22
End: 2022-01-03

## 2022-01-03 DIAGNOSIS — Z20.822 EXPOSURE TO COVID-19 VIRUS: Primary | ICD-10-CM

## 2022-01-03 LAB
Lab: NORMAL
PERFORMING INSTRUMENT: NORMAL
QC PASS/FAIL: NORMAL
SARS-COV-2, POC: NORMAL

## 2022-01-03 PROCEDURE — 87426 SARSCOV CORONAVIRUS AG IA: CPT | Performed by: NURSE PRACTITIONER

## 2022-01-10 RX ORDER — FERROUS SULFATE 325(65) MG
TABLET ORAL
Qty: 60 TABLET | Refills: 0 | Status: SHIPPED
Start: 2022-01-10 | End: 2022-01-27

## 2022-01-19 ENCOUNTER — HOSPITAL ENCOUNTER (OUTPATIENT)
Dept: INFUSION THERAPY | Age: 22
End: 2022-01-19

## 2022-01-27 ENCOUNTER — HOSPITAL ENCOUNTER (OUTPATIENT)
Dept: INFUSION THERAPY | Age: 22
Discharge: HOME | End: 2022-01-27
Payer: COMMERCIAL

## 2022-01-27 ENCOUNTER — OFFICE VISIT (OUTPATIENT)
Dept: ONCOLOGY | Age: 22
End: 2022-01-27
Payer: COMMERCIAL

## 2022-01-27 VITALS
HEART RATE: 97 BPM | WEIGHT: 116 LBS | HEIGHT: 62 IN | SYSTOLIC BLOOD PRESSURE: 122 MMHG | OXYGEN SATURATION: 98 % | DIASTOLIC BLOOD PRESSURE: 70 MMHG | RESPIRATION RATE: 16 BRPM | TEMPERATURE: 97.1 F | BODY MASS INDEX: 21.35 KG/M2

## 2022-01-27 DIAGNOSIS — D50.9 IRON DEFICIENCY ANEMIA, UNSPECIFIED IRON DEFICIENCY ANEMIA TYPE: Primary | ICD-10-CM

## 2022-01-27 DIAGNOSIS — D50.9 IRON DEFICIENCY ANEMIA, UNSPECIFIED IRON DEFICIENCY ANEMIA TYPE: ICD-10-CM

## 2022-01-27 LAB
BASOPHILS ABSOLUTE: 0.04 E9/L (ref 0–0.2)
BASOPHILS RELATIVE PERCENT: 0.6 % (ref 0–2)
EOSINOPHILS ABSOLUTE: 0.05 E9/L (ref 0.05–0.5)
EOSINOPHILS RELATIVE PERCENT: 0.7 % (ref 0–6)
FERRITIN: 34 NG/ML
HCT VFR BLD CALC: 39.9 % (ref 34–48)
HEMOGLOBIN: 12.4 G/DL (ref 11.5–15.5)
IMMATURE GRANULOCYTES #: 0.02 E9/L
IMMATURE GRANULOCYTES %: 0.3 % (ref 0–5)
IRON SATURATION: 53 % (ref 15–50)
IRON: 149 MCG/DL (ref 37–145)
LYMPHOCYTES ABSOLUTE: 1.47 E9/L (ref 1.5–4)
LYMPHOCYTES RELATIVE PERCENT: 21.3 % (ref 20–42)
MCH RBC QN AUTO: 25.7 PG (ref 26–35)
MCHC RBC AUTO-ENTMCNC: 31.1 % (ref 32–34.5)
MCV RBC AUTO: 82.6 FL (ref 80–99.9)
MONOCYTES ABSOLUTE: 0.5 E9/L (ref 0.1–0.95)
MONOCYTES RELATIVE PERCENT: 7.3 % (ref 2–12)
NEUTROPHILS ABSOLUTE: 4.81 E9/L (ref 1.8–7.3)
NEUTROPHILS RELATIVE PERCENT: 69.8 % (ref 43–80)
PDW BLD-RTO: 16.1 FL (ref 11.5–15)
PLATELET # BLD: 199 E9/L (ref 130–450)
PMV BLD AUTO: 9.4 FL (ref 7–12)
RBC # BLD: 4.83 E12/L (ref 3.5–5.5)
TOTAL IRON BINDING CAPACITY: 280 MCG/DL (ref 250–450)
WBC # BLD: 6.9 E9/L (ref 4.5–11.5)

## 2022-01-27 PROCEDURE — 99212 OFFICE O/P EST SF 10 MIN: CPT

## 2022-01-27 PROCEDURE — 36415 COLL VENOUS BLD VENIPUNCTURE: CPT

## 2022-01-27 PROCEDURE — 99214 OFFICE O/P EST MOD 30 MIN: CPT | Performed by: INTERNAL MEDICINE

## 2022-01-27 RX ORDER — FERROUS SULFATE 325(65) MG
325 TABLET ORAL
Qty: 30 TABLET | Refills: 0 | Status: SHIPPED
Start: 2022-01-27 | End: 2022-02-17

## 2022-01-27 RX ORDER — NORETHINDRONE ACETATE AND ETHINYL ESTRADIOL 1MG-20(24)
KIT ORAL
COMMUNITY
Start: 2022-01-11

## 2022-01-27 NOTE — PROGRESS NOTES
Department of [facility] Oncology  Attending Clinic Note    Reason for Visit: Follow-up on a patient with Iron deficiency anemia and alpha Thalassemia    PCP:  Katie Crow MD    History of Present Illness:  23 y/o female with hx of constipation who was noted to have microcytic anemia on routine blood work. On 10/26/2021: Hb 5.9  Hct 17.7  MCV 53.1    WBC 4.8  Peripheral blood smear:   Microcytic hypochromic anemia with polychromasia, suggestive of iron deficiency. Unremarkable platelets and white blood cells  Retic 1.3 (0.4-1.9%)  Fe 10 TIBC 461 FeSat 2%  Transferrin 391 (200-360)  Ferritin 3  Given 1 unit prbc 10/26/2021  On 10/28/2021: Hb 7.8  Hct 29.1  MCV 58.3    WBC 4.0  Hb 8.5 on 11/03/2021. She has been on FeSO4 325 mg po TID with improvement in her energy level. On 11/11/2021: Hb 9.9  Hct 35.3  MCV 68.1  WBC 5.4    Peripheral blood smear:  Microcytic anemia with absolute red cell count within normal range. Dimorphic population of red cells noted, which may indicate recent red blood cell transfusion. Marked anisocytosis is present, with moderate poikilocytosis and hypochromia.    Rare schistocytes, target cells, and teardrop cells are all present. Negative for rouleaux formation. White blood cells and platelets (counts and morphology) are within normal limits. BUN 11 Creat 0.8  LFTs wnl  Fe 176 TIBC 357 FeSat 49% Ferritin 82   Epo 13 (4-27)  Hemolytic work-up Negative  Zinc, VitB6, Copper WNL  SPEP: Normal. No monoclonal protein identified  SIFE: Normal. No monoclonal protein identified.      Sickle cell screen: Positive  Hemoglobin electrophoresis: Alpha (+) thalassemia (silent carrier) state (-a/aa)  Genetic counseling    Peripheral blood flow cytometry noted no immunophenotypic evidence of acute leukemia or a T-cell or B-cell neoplasm  Peripheral blood MDS FISH: Negative Study  JAK2 Mutation Not detected    Microcytic anemia combination of iron deficiency anemia and alpha thalassemia    Review of Systems;  CONSTITUTIONAL: No fever, chills. Fair appetite and energy level  ENMT: Eyes: No diplopia; Nose: No epistaxis. Mouth: No sore throat. RESPIRATORY: No hemoptysis, shortness of breath, cough. CARDIOVASCULAR: No chest pain, palpitations. GASTROINTESTINAL: No N/V  GENITOURINARY: No dysuria, urinary frequency, hematuria. NEURO: No syncope, presyncope, headache. Remainder:  ROS NEGATIVE    Past Medical History:      Diagnosis Date    Anemia     Constipation     History of blood transfusion      Medications:  Reviewed and reconciled. Allergies: Allergies   Allergen Reactions    Other Swelling     Oragel-localized swelling     Physical Exam:  /70 (Site: Right Upper Arm, Position: Sitting, Cuff Size: Medium Adult)   Pulse 97   Temp 97.1 °F (36.2 °C) (Infrared)   Resp 16   Ht 5' 2\" (1.575 m)   Wt 116 lb (52.6 kg)   SpO2 98%   BMI 21.22 kg/m²   GENERAL: Alert, oriented x 3, not in distress. HEENT: PERRLA; EOMI. Oropharynx clear. NECK: Supple. Without lymphadenopathy. EXTREMITIES: Without clubbing, cyanosis, or edema. NEUROLOGIC: No focal deficits. Impression/Plan:  23 y/o female with hx of constipation who was noted to have microcytic anemia on routine blood work. On 10/26/2021: Hb 5.9  Hct 17.7  MCV 53.1    WBC 4.8  Peripheral blood smear:   Microcytic hypochromic anemia with polychromasia, suggestive of iron deficiency. Unremarkable platelets and white blood cells  Retic 1.3 (0.4-1.9%)  Fe 10 TIBC 461 FeSat 2%  Transferrin 391 (200-360)  Ferritin 3  Given 1 unit prbc 10/26/2021  On 10/28/2021: Hb 7.8  Hct 29.1  MCV 58.3    WBC 4.0  Hb 8.5 on 11/03/2021. She has been on FeSO4 325 mg po TID with improvement in her energy level. On 11/11/2021: Hb 9.9  Hct 35.3  MCV 68.1  WBC 5.4    Peripheral blood smear:  Microcytic anemia with absolute red cell count within normal range.    Dimorphic population of red cells noted, which may indicate recent red blood cell transfusion. Marked anisocytosis is present, with moderate poikilocytosis and hypochromia.    Rare schistocytes, target cells, and teardrop cells are all present. Negative for rouleaux formation. White blood cells and platelets (counts and morphology) are within normal limits. BUN 11 Creat 0.8  LFTs wnl  Fe 176 TIBC 357 FeSat 49% Ferritin 82   Epo 13 (4-27)  Hemolytic work-up Negative  Zinc, VitB6, Copper WNL  SPEP: Normal. No monoclonal protein identified  SIFE: Normal. No monoclonal protein identified.      Sickle cell screen: Positive  Hemoglobin electrophoresis: Alpha (+) thalassemia (silent carrier) state (-a/aa)  Genetic counseling    Peripheral blood flow cytometry noted no immunophenotypic evidence of acute leukemia or a T-cell or B-cell neoplasm  Peripheral blood MDS FISH: Negative Study  JAK2 Mutation Not detected    Microcytic anemia combination of iron deficiency anemia and alpha thalassemia  Changed FeSO4 325 mg to bid    On 01/27/2022:  Hb 12.4  Hct 39.9  MCV 82.6  WBC 6.9    Fe 149 TIBC 280 FeSat 53% Ferritin 34  Change FeSO4 325 mg to po once daily    RTC 1 month with prior labs    Sissy Finch MD   1/27/2022

## 2022-01-28 NOTE — PROGRESS NOTES
Called patient ot discuss labs and to start taking her iron daily from BID-expressed understanding, will call with any questions or concerns

## 2022-02-13 DIAGNOSIS — D56.0 ALPHA THALASSEMIA (HCC): ICD-10-CM

## 2022-02-13 DIAGNOSIS — D50.9 IRON DEFICIENCY ANEMIA, UNSPECIFIED IRON DEFICIENCY ANEMIA TYPE: Primary | ICD-10-CM

## 2022-02-15 NOTE — TELEPHONE ENCOUNTER
Refill requested for Ferrous Sulfate . Order pended and routed to provider for review and authorization.

## 2022-02-17 RX ORDER — FERROUS SULFATE 325(65) MG
325 TABLET ORAL 2 TIMES DAILY
Qty: 60 TABLET | Refills: 3 | Status: SHIPPED
Start: 2022-02-17 | End: 2022-03-17

## 2022-02-21 DIAGNOSIS — D50.9 IRON DEFICIENCY ANEMIA, UNSPECIFIED IRON DEFICIENCY ANEMIA TYPE: ICD-10-CM

## 2022-02-21 DIAGNOSIS — D56.0 ALPHA THALASSEMIA (HCC): ICD-10-CM

## 2022-02-21 RX ORDER — FERROUS SULFATE 325(65) MG
TABLET ORAL
Qty: 30 TABLET | OUTPATIENT
Start: 2022-02-21

## 2022-02-24 DIAGNOSIS — D50.9 IRON DEFICIENCY ANEMIA, UNSPECIFIED IRON DEFICIENCY ANEMIA TYPE: Primary | ICD-10-CM

## 2022-03-01 ENCOUNTER — OFFICE VISIT (OUTPATIENT)
Dept: PRIMARY CARE CLINIC | Age: 22
End: 2022-03-01

## 2022-03-01 VITALS
HEIGHT: 62 IN | DIASTOLIC BLOOD PRESSURE: 80 MMHG | WEIGHT: 115 LBS | BODY MASS INDEX: 21.16 KG/M2 | HEART RATE: 113 BPM | OXYGEN SATURATION: 98 % | TEMPERATURE: 97.8 F | SYSTOLIC BLOOD PRESSURE: 118 MMHG

## 2022-03-01 DIAGNOSIS — S51.851A HUMAN BITE OF FOREARM, RIGHT, INITIAL ENCOUNTER: Primary | ICD-10-CM
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
## 2022-03-01 PROCEDURE — 99213 OFFICE O/P EST LOW 20 MIN: CPT | Performed by: NURSE PRACTITIONER

## 2022-03-15 ENCOUNTER — HOSPITAL ENCOUNTER (OUTPATIENT)
Dept: INFUSION THERAPY | Age: 22
Discharge: HOME | End: 2022-03-15
Payer: COMMERCIAL

## 2022-03-15 DIAGNOSIS — D50.9 IRON DEFICIENCY ANEMIA, UNSPECIFIED IRON DEFICIENCY ANEMIA TYPE: ICD-10-CM

## 2022-03-15 LAB
ALBUMIN SERPL-MCNC: 4.1 G/DL (ref 3.5–5.2)
ALP BLD-CCNC: 49 U/L (ref 35–104)
ALT SERPL-CCNC: 20 U/L (ref 0–32)
ANION GAP SERPL CALCULATED.3IONS-SCNC: 11 MMOL/L (ref 7–16)
AST SERPL-CCNC: 22 U/L (ref 0–31)
BASOPHILS ABSOLUTE: 0.04 E9/L (ref 0–0.2)
BASOPHILS RELATIVE PERCENT: 0.6 % (ref 0–2)
BILIRUB SERPL-MCNC: 0.3 MG/DL (ref 0–1.2)
BUN BLDV-MCNC: 12 MG/DL (ref 6–20)
CALCIUM SERPL-MCNC: 9.1 MG/DL (ref 8.6–10.2)
CHLORIDE BLD-SCNC: 103 MMOL/L (ref 98–107)
CO2: 22 MMOL/L (ref 22–29)
CREAT SERPL-MCNC: 0.7 MG/DL (ref 0.5–1)
EOSINOPHILS ABSOLUTE: 0.07 E9/L (ref 0.05–0.5)
EOSINOPHILS RELATIVE PERCENT: 1.1 % (ref 0–6)
FERRITIN: 41 NG/ML
GFR AFRICAN AMERICAN: >60
GFR NON-AFRICAN AMERICAN: >60 ML/MIN/1.73
GLUCOSE BLD-MCNC: 82 MG/DL (ref 74–99)
HCT VFR BLD CALC: 43.3 % (ref 34–48)
HEMOGLOBIN: 14.1 G/DL (ref 11.5–15.5)
IMMATURE GRANULOCYTES #: 0.01 E9/L
IMMATURE GRANULOCYTES %: 0.2 % (ref 0–5)
IRON SATURATION: 67 % (ref 15–50)
IRON: 246 MCG/DL (ref 37–145)
LYMPHOCYTES ABSOLUTE: 1.93 E9/L (ref 1.5–4)
LYMPHOCYTES RELATIVE PERCENT: 29.2 % (ref 20–42)
MCH RBC QN AUTO: 26.3 PG (ref 26–35)
MCHC RBC AUTO-ENTMCNC: 32.6 % (ref 32–34.5)
MCV RBC AUTO: 80.8 FL (ref 80–99.9)
MONOCYTES ABSOLUTE: 0.51 E9/L (ref 0.1–0.95)
MONOCYTES RELATIVE PERCENT: 7.7 % (ref 2–12)
NEUTROPHILS ABSOLUTE: 4.05 E9/L (ref 1.8–7.3)
NEUTROPHILS RELATIVE PERCENT: 61.2 % (ref 43–80)
PDW BLD-RTO: 13.9 FL (ref 11.5–15)
PLATELET # BLD: 232 E9/L (ref 130–450)
PMV BLD AUTO: 9.9 FL (ref 7–12)
POTASSIUM SERPL-SCNC: 4 MMOL/L (ref 3.5–5)
RBC # BLD: 5.36 E12/L (ref 3.5–5.5)
SODIUM BLD-SCNC: 136 MMOL/L (ref 132–146)
TOTAL IRON BINDING CAPACITY: 367 MCG/DL (ref 250–450)
TOTAL PROTEIN: 8 G/DL (ref 6.4–8.3)
WBC # BLD: 6.6 E9/L (ref 4.5–11.5)

## 2022-03-15 PROCEDURE — 80053 COMPREHEN METABOLIC PANEL: CPT

## 2022-03-15 PROCEDURE — 36415 COLL VENOUS BLD VENIPUNCTURE: CPT

## 2022-03-15 PROCEDURE — 83550 IRON BINDING TEST: CPT

## 2022-03-15 PROCEDURE — 85025 COMPLETE CBC W/AUTO DIFF WBC: CPT

## 2022-03-15 PROCEDURE — 82728 ASSAY OF FERRITIN: CPT

## 2022-03-15 PROCEDURE — 83540 ASSAY OF IRON: CPT

## 2022-03-17 ENCOUNTER — HOSPITAL ENCOUNTER (OUTPATIENT)
Dept: INFUSION THERAPY | Age: 22
Discharge: HOME | End: 2022-03-17

## 2022-03-17 ENCOUNTER — OFFICE VISIT (OUTPATIENT)
Dept: ONCOLOGY | Age: 22
End: 2022-03-17
Payer: COMMERCIAL

## 2022-03-17 VITALS
TEMPERATURE: 98.3 F | OXYGEN SATURATION: 98 % | SYSTOLIC BLOOD PRESSURE: 119 MMHG | RESPIRATION RATE: 16 BRPM | WEIGHT: 117 LBS | HEART RATE: 107 BPM | BODY MASS INDEX: 21.53 KG/M2 | DIASTOLIC BLOOD PRESSURE: 75 MMHG | HEIGHT: 62 IN

## 2022-03-17 DIAGNOSIS — D50.9 IRON DEFICIENCY ANEMIA, UNSPECIFIED IRON DEFICIENCY ANEMIA TYPE: Primary | ICD-10-CM

## 2022-03-17 DIAGNOSIS — D56.0 ALPHA THALASSEMIA (HCC): ICD-10-CM

## 2022-03-17 PROCEDURE — 99212 OFFICE O/P EST SF 10 MIN: CPT

## 2022-03-17 PROCEDURE — 99214 OFFICE O/P EST MOD 30 MIN: CPT | Performed by: INTERNAL MEDICINE

## 2022-03-17 NOTE — PROGRESS NOTES
Department of [facility] Oncology  Attending Clinic Note    Reason for Visit: Follow-up on a patient with Iron deficiency anemia and alpha Thalassemia    PCP:  Elbert Sanders MD    History of Present Illness:  21 y/o female with hx of constipation who was noted to have microcytic anemia on routine blood work. On 10/26/2021: Hb 5.9  Hct 17.7  MCV 53.1    WBC 4.8  Peripheral blood smear:   Microcytic hypochromic anemia with polychromasia, suggestive of iron deficiency. Unremarkable platelets and white blood cells  Retic 1.3 (0.4-1.9%)  Fe 10 TIBC 461 FeSat 2%  Transferrin 391 (200-360)  Ferritin 3  Given 1 unit prbc 10/26/2021  On 10/28/2021: Hb 7.8  Hct 29.1  MCV 58.3    WBC 4.0  Hb 8.5 on 11/03/2021. She has been on FeSO4 325 mg po TID with improvement in her energy level. On 11/11/2021: Hb 9.9  Hct 35.3  MCV 68.1  WBC 5.4    Peripheral blood smear:  Microcytic anemia with absolute red cell count within normal range. Dimorphic population of red cells noted, which may indicate recent red blood cell transfusion. Marked anisocytosis is present, with moderate poikilocytosis and hypochromia.    Rare schistocytes, target cells, and teardrop cells are all present. Negative for rouleaux formation. White blood cells and platelets (counts and morphology) are within normal limits. BUN 11 Creat 0.8  LFTs wnl  Fe 176 TIBC 357 FeSat 49% Ferritin 82   Epo 13 (4-27)  Hemolytic work-up Negative  Zinc, VitB6, Copper WNL  SPEP: Normal. No monoclonal protein identified  SIFE: Normal. No monoclonal protein identified.      Sickle cell screen: Positive  Hemoglobin electrophoresis: Alpha (+) thalassemia (silent carrier) state (-a/aa)  Genetic counseling    Peripheral blood flow cytometry noted no immunophenotypic evidence of acute leukemia or a T-cell or B-cell neoplasm  Peripheral blood MDS FISH: Negative Study  JAK2 Mutation Not detected    Microcytic anemia combination of iron deficiency anemia and alpha thalassemia  Today 03/17/2022. No fever, chills. Fair appetite and energy level    Review of Systems;  CONSTITUTIONAL: No fever, chills. Fair appetite and energy level  ENMT: Eyes: No diplopia; Nose: No epistaxis. Mouth: No sore throat. RESPIRATORY: No hemoptysis, shortness of breath, cough. CARDIOVASCULAR: No chest pain, palpitations. GASTROINTESTINAL: No N/V  GENITOURINARY: No dysuria, urinary frequency, hematuria. NEURO: No syncope, presyncope, headache. Remainder:ROS NEGATIVE    Past Medical History:      Diagnosis Date    Anemia     Constipation     History of blood transfusion      Medications:  Reviewed and reconciled. Allergies: Allergies   Allergen Reactions    Other Swelling     Oragel-localized swelling     Physical Exam:  /75 (Site: Left Upper Arm, Position: Sitting, Cuff Size: Medium Adult)   Pulse 107   Temp 98.3 °F (36.8 °C) (Infrared)   Resp 16   Ht 5' 2\" (1.575 m)   Wt 117 lb (53.1 kg)   SpO2 98%   BMI 21.40 kg/m²   GENERAL: Alert, oriented x 3, not in distress. HEENT: PERRLA; EOMI. Oropharynx clear. NECK: Supple. Without lymphadenopathy. EXTREMITIES: Without clubbing, cyanosis, or edema. NEUROLOGIC: No focal deficits. Impression/Plan:  23 y/o female with hx of constipation who was noted to have microcytic anemia on routine blood work. On 10/26/2021: Hb 5.9  Hct 17.7  MCV 53.1    WBC 4.8  Peripheral blood smear:   Microcytic hypochromic anemia with polychromasia, suggestive of iron deficiency. Unremarkable platelets and white blood cells  Retic 1.3 (0.4-1.9%)  Fe 10 TIBC 461 FeSat 2%  Transferrin 391 (200-360)  Ferritin 3  Given 1 unit prbc 10/26/2021  On 10/28/2021: Hb 7.8  Hct 29.1  MCV 58.3    WBC 4.0  Hb 8.5 on 11/03/2021. She has been on FeSO4 325 mg po TID with improvement in her energy level.      On 11/11/2021: Hb 9.9  Hct 35.3  MCV 68.1  WBC 5.4    Peripheral blood smear:  Microcytic anemia with absolute red

## 2022-04-27 ENCOUNTER — HOSPITAL ENCOUNTER (OUTPATIENT)
Dept: INFUSION THERAPY | Age: 22
Discharge: HOME | End: 2022-04-27
Payer: COMMERCIAL

## 2022-04-27 DIAGNOSIS — D50.9 IRON DEFICIENCY ANEMIA, UNSPECIFIED IRON DEFICIENCY ANEMIA TYPE: ICD-10-CM

## 2022-04-27 LAB
ALBUMIN SERPL-MCNC: 4.3 G/DL (ref 3.5–5.2)
ALP BLD-CCNC: 62 U/L (ref 35–104)
ALT SERPL-CCNC: 14 U/L (ref 0–32)
ANION GAP SERPL CALCULATED.3IONS-SCNC: 10 MMOL/L (ref 7–16)
AST SERPL-CCNC: 18 U/L (ref 0–31)
BASOPHILS ABSOLUTE: 0.05 E9/L (ref 0–0.2)
BASOPHILS RELATIVE PERCENT: 0.7 % (ref 0–2)
BILIRUB SERPL-MCNC: 0.4 MG/DL (ref 0–1.2)
BUN BLDV-MCNC: 13 MG/DL (ref 6–20)
CALCIUM SERPL-MCNC: 9.2 MG/DL (ref 8.6–10.2)
CHLORIDE BLD-SCNC: 103 MMOL/L (ref 98–107)
CO2: 24 MMOL/L (ref 22–29)
CREAT SERPL-MCNC: 0.7 MG/DL (ref 0.5–1)
EOSINOPHILS ABSOLUTE: 0.09 E9/L (ref 0.05–0.5)
EOSINOPHILS RELATIVE PERCENT: 1.2 % (ref 0–6)
FERRITIN: 37 NG/ML
GFR AFRICAN AMERICAN: >60
GFR NON-AFRICAN AMERICAN: >60 ML/MIN/1.73
GLUCOSE BLD-MCNC: 92 MG/DL (ref 74–99)
HCT VFR BLD CALC: 40.7 % (ref 34–48)
HEMOGLOBIN: 12.9 G/DL (ref 11.5–15.5)
IMMATURE GRANULOCYTES #: 0.03 E9/L
IMMATURE GRANULOCYTES %: 0.4 % (ref 0–5)
IRON SATURATION: 44 % (ref 15–50)
IRON: 125 MCG/DL (ref 37–145)
LYMPHOCYTES ABSOLUTE: 2.04 E9/L (ref 1.5–4)
LYMPHOCYTES RELATIVE PERCENT: 27.3 % (ref 20–42)
MCH RBC QN AUTO: 26.5 PG (ref 26–35)
MCHC RBC AUTO-ENTMCNC: 31.7 % (ref 32–34.5)
MCV RBC AUTO: 83.6 FL (ref 80–99.9)
MONOCYTES ABSOLUTE: 0.45 E9/L (ref 0.1–0.95)
MONOCYTES RELATIVE PERCENT: 6 % (ref 2–12)
NEUTROPHILS ABSOLUTE: 4.81 E9/L (ref 1.8–7.3)
NEUTROPHILS RELATIVE PERCENT: 64.4 % (ref 43–80)
PDW BLD-RTO: 13.5 FL (ref 11.5–15)
PLATELET # BLD: 196 E9/L (ref 130–450)
PMV BLD AUTO: 9.8 FL (ref 7–12)
POTASSIUM SERPL-SCNC: 4 MMOL/L (ref 3.5–5)
RBC # BLD: 4.87 E12/L (ref 3.5–5.5)
SODIUM BLD-SCNC: 137 MMOL/L (ref 132–146)
TOTAL IRON BINDING CAPACITY: 281 MCG/DL (ref 250–450)
TOTAL PROTEIN: 7.4 G/DL (ref 6.4–8.3)
WBC # BLD: 7.5 E9/L (ref 4.5–11.5)

## 2022-04-27 PROCEDURE — 83550 IRON BINDING TEST: CPT

## 2022-04-27 PROCEDURE — 36415 COLL VENOUS BLD VENIPUNCTURE: CPT

## 2022-04-27 PROCEDURE — 82728 ASSAY OF FERRITIN: CPT

## 2022-04-27 PROCEDURE — 85025 COMPLETE CBC W/AUTO DIFF WBC: CPT

## 2022-04-27 PROCEDURE — 80053 COMPREHEN METABOLIC PANEL: CPT

## 2022-04-27 PROCEDURE — 83540 ASSAY OF IRON: CPT

## 2022-04-28 ENCOUNTER — OFFICE VISIT (OUTPATIENT)
Dept: ONCOLOGY | Age: 22
End: 2022-04-28
Payer: COMMERCIAL

## 2022-04-28 ENCOUNTER — HOSPITAL ENCOUNTER (OUTPATIENT)
Dept: INFUSION THERAPY | Age: 22
Discharge: HOME | End: 2022-04-28

## 2022-04-28 VITALS
TEMPERATURE: 97.3 F | SYSTOLIC BLOOD PRESSURE: 117 MMHG | BODY MASS INDEX: 22.26 KG/M2 | WEIGHT: 121 LBS | OXYGEN SATURATION: 100 % | HEART RATE: 98 BPM | DIASTOLIC BLOOD PRESSURE: 64 MMHG | RESPIRATION RATE: 16 BRPM | HEIGHT: 62 IN

## 2022-04-28 DIAGNOSIS — D50.9 IRON DEFICIENCY ANEMIA, UNSPECIFIED IRON DEFICIENCY ANEMIA TYPE: Primary | ICD-10-CM

## 2022-04-28 DIAGNOSIS — D56.0 ALPHA THALASSEMIA (HCC): ICD-10-CM

## 2022-04-28 PROCEDURE — 99214 OFFICE O/P EST MOD 30 MIN: CPT | Performed by: INTERNAL MEDICINE

## 2022-04-28 PROCEDURE — 99212 OFFICE O/P EST SF 10 MIN: CPT

## 2022-04-28 NOTE — PROGRESS NOTES
Department of [facility] Oncology  Attending Clinic Note    Reason for Visit: Follow-up on a patient with Iron deficiency anemia and alpha Thalassemia    PCP:  Pascual Delvalle MD    History of Present Illness:  21 y/o female with hx of constipation who was noted to have microcytic anemia on routine blood work. On 10/26/2021: Hb 5.9  Hct 17.7  MCV 53.1    WBC 4.8  Peripheral blood smear:   Microcytic hypochromic anemia with polychromasia, suggestive of iron deficiency. Unremarkable platelets and white blood cells  Retic 1.3 (0.4-1.9%)  Fe 10 TIBC 461 FeSat 2%  Transferrin 391 (200-360)  Ferritin 3  Given 1 unit prbc 10/26/2021  On 10/28/2021: Hb 7.8  Hct 29.1  MCV 58.3    WBC 4.0  Hb 8.5 on 11/03/2021. She has been on FeSO4 325 mg po TID with improvement in her energy level. On 11/11/2021: Hb 9.9  Hct 35.3  MCV 68.1  WBC 5.4    Peripheral blood smear:  Microcytic anemia with absolute red cell count within normal range. Dimorphic population of red cells noted, which may indicate recent red blood cell transfusion. Marked anisocytosis is present, with moderate poikilocytosis and hypochromia.    Rare schistocytes, target cells, and teardrop cells are all present. Negative for rouleaux formation. White blood cells and platelets (counts and morphology) are within normal limits. BUN 11 Creat 0.8  LFTs wnl  Fe 176 TIBC 357 FeSat 49% Ferritin 82   Epo 13 (4-27)  Hemolytic work-up Negative  Zinc, VitB6, Copper WNL  SPEP: Normal. No monoclonal protein identified  SIFE: Normal. No monoclonal protein identified.      Sickle cell screen: Positive  Hemoglobin electrophoresis: Alpha (+) thalassemia (silent carrier) state (-a/aa)  Genetic counseling    Peripheral blood flow cytometry noted no immunophenotypic evidence of acute leukemia or a T-cell or B-cell neoplasm  Peripheral blood MDS FISH: Negative Study  JAK2 Mutation Not detected    Microcytic anemia combination of iron deficiency anemia and alpha thalassemia  Changed FeSO4 325 mg to bid  On 01/27/2022:  Hb 12.4  Hct 39.9  MCV 82.6  WBC 6.9    Fe 149 TIBC 280 FeSat 53% Ferritin 34  Changed FeSO4 325 mg to po once daily    On 03/15/2022:   Hb 14.1  Hct 43.3  MCV 80.8   WBC 6.6     Fe 246 TIBC 367  FeSat 67% Ferritin 41  D/C FeSO4 325 mg once daily    Today 04/28/2022. No fever, chills. Fair appetite and energy level. Getting ready for the final exams next week    Review of Systems;  CONSTITUTIONAL: No fever, chills. Fair appetite and energy level  ENMT: Eyes: No diplopia; Nose: No epistaxis. Mouth: No sore throat. RESPIRATORY: No hemoptysis, shortness of breath, cough. CARDIOVASCULAR: No chest pain, palpitations. GASTROINTESTINAL: No N/V  GENITOURINARY: No dysuria, urinary frequency, hematuria. NEURO: No syncope, presyncope, headache. Remainder:ROS NEGATIVE    Past Medical History:      Diagnosis Date    Anemia     Constipation     History of blood transfusion      Medications:  Reviewed and reconciled. Allergies: Allergies   Allergen Reactions    Other Swelling     Oragel-localized swelling     Physical Exam:  /64 (Site: Right Upper Arm, Position: Sitting, Cuff Size: Medium Adult)   Pulse 98   Temp 97.3 °F (36.3 °C) (Infrared)   Resp 16   Ht 5' 2\" (1.575 m)   Wt 121 lb (54.9 kg)   SpO2 100%   BMI 22.13 kg/m²   GENERAL: Alert, oriented x 3, not in distress. HEENT: PERRLA; EOMI. Oropharynx clear. NECK: Supple. Without lymphadenopathy. EXTREMITIES: Without clubbing, cyanosis, or edema. NEUROLOGIC: No focal deficits. Lab Results   Component Value Date    WBC 7.5 04/27/2022    HGB 12.9 04/27/2022    HCT 40.7 04/27/2022    MCV 83.6 04/27/2022     04/27/2022     Lab Results   Component Value Date    IRON 125 04/27/2022    TIBC 281 04/27/2022    FERRITIN 37 04/27/2022     Impression/Plan:  23 y/o female with hx of constipation who was noted to have microcytic anemia on routine blood work. On 10/26/2021: Hb 5.9  Hct 17.7  MCV 53.1    WBC 4.8  Peripheral blood smear:   Microcytic hypochromic anemia with polychromasia, suggestive of iron deficiency. Unremarkable platelets and white blood cells  Retic 1.3 (0.4-1.9%)  Fe 10 TIBC 461 FeSat 2%  Transferrin 391 (200-360)  Ferritin 3  Given 1 unit prbc 10/26/2021  On 10/28/2021: Hb 7.8  Hct 29.1  MCV 58.3    WBC 4.0  Hb 8.5 on 11/03/2021. She has been on FeSO4 325 mg po TID with improvement in her energy level. On 11/11/2021: Hb 9.9  Hct 35.3  MCV 68.1  WBC 5.4    Peripheral blood smear:  Microcytic anemia with absolute red cell count within normal range. Dimorphic population of red cells noted, which may indicate recent red blood cell transfusion. Marked anisocytosis is present, with moderate poikilocytosis and hypochromia.    Rare schistocytes, target cells, and teardrop cells are all present. Negative for rouleaux formation. White blood cells and platelets (counts and morphology) are within normal limits. BUN 11 Creat 0.8  LFTs wnl  Fe 176 TIBC 357 FeSat 49% Ferritin 82   Epo 13 (4-27)  Hemolytic work-up Negative  Zinc, VitB6, Copper WNL  SPEP: Normal. No monoclonal protein identified  SIFE: Normal. No monoclonal protein identified.      Sickle cell screen: Positive  Hemoglobin electrophoresis: Alpha (+) thalassemia (silent carrier) state (-a/aa)  Genetic counseling recommended and ordered    Peripheral blood flow cytometry noted no immunophenotypic evidence of acute leukemia or a T-cell or B-cell neoplasm  Peripheral blood MDS FISH: Negative Study  JAK2 Mutation Not detected    Microcytic anemia combination of iron deficiency anemia and alpha thalassemia  Changed FeSO4 325 mg to bid    On 01/27/2022:  Hb 12.4  Hct 39.9  MCV 82.6  WBC 6.9    Fe 149 TIBC 280 FeSat 53% Ferritin 34  Changed FeSO4 325 mg to po once daily    On 03/15/2022:   Hb 14.1  Hct 43.3  MCV 80.8   WBC 6.6     Fe 246 TIBC 367  FeSat 67% Ferritin 41  D/C FeSO4 325 mg once daily    On 04/27/2022:  Hb 12.9   Hct 40.9  MCV 83.6   WBC 7.5     Fe 125 TIBC 281  FeSat 44%  Ferritin 37  Continue to be off FeSO4 at this time.     RTC 2 months with prior labs     Anastasia Means MD   4/28/2022

## 2022-06-21 ENCOUNTER — HOSPITAL ENCOUNTER (OUTPATIENT)
Dept: INFUSION THERAPY | Age: 22
Discharge: HOME | End: 2022-06-21
Payer: COMMERCIAL

## 2022-06-21 DIAGNOSIS — D50.9 IRON DEFICIENCY ANEMIA, UNSPECIFIED IRON DEFICIENCY ANEMIA TYPE: ICD-10-CM

## 2022-06-21 LAB
ALBUMIN SERPL-MCNC: 4.2 G/DL (ref 3.5–5.2)
ALP BLD-CCNC: 51 U/L (ref 35–104)
ALT SERPL-CCNC: 16 U/L (ref 0–32)
ANION GAP SERPL CALCULATED.3IONS-SCNC: 10 MMOL/L (ref 7–16)
AST SERPL-CCNC: 16 U/L (ref 0–31)
BASOPHILS ABSOLUTE: 0.05 E9/L (ref 0–0.2)
BASOPHILS RELATIVE PERCENT: 0.7 % (ref 0–2)
BILIRUB SERPL-MCNC: <0.2 MG/DL (ref 0–1.2)
BUN BLDV-MCNC: 11 MG/DL (ref 6–20)
CALCIUM SERPL-MCNC: 9.2 MG/DL (ref 8.6–10.2)
CHLORIDE BLD-SCNC: 106 MMOL/L (ref 98–107)
CO2: 24 MMOL/L (ref 22–29)
CREAT SERPL-MCNC: 0.8 MG/DL (ref 0.5–1)
EOSINOPHILS ABSOLUTE: 0.08 E9/L (ref 0.05–0.5)
EOSINOPHILS RELATIVE PERCENT: 1.1 % (ref 0–6)
FERRITIN: 15 NG/ML
GFR AFRICAN AMERICAN: >60
GFR NON-AFRICAN AMERICAN: >60 ML/MIN/1.73
GLUCOSE BLD-MCNC: 113 MG/DL (ref 74–99)
HCT VFR BLD CALC: 41.7 % (ref 34–48)
HEMOGLOBIN: 13.5 G/DL (ref 11.5–15.5)
IMMATURE GRANULOCYTES #: 0.02 E9/L
IMMATURE GRANULOCYTES %: 0.3 % (ref 0–5)
IRON SATURATION: 35 % (ref 15–50)
IRON: 129 MCG/DL (ref 37–145)
LYMPHOCYTES ABSOLUTE: 2.15 E9/L (ref 1.5–4)
LYMPHOCYTES RELATIVE PERCENT: 30.5 % (ref 20–42)
MCH RBC QN AUTO: 26.7 PG (ref 26–35)
MCHC RBC AUTO-ENTMCNC: 32.4 % (ref 32–34.5)
MCV RBC AUTO: 82.6 FL (ref 80–99.9)
MONOCYTES ABSOLUTE: 0.41 E9/L (ref 0.1–0.95)
MONOCYTES RELATIVE PERCENT: 5.8 % (ref 2–12)
NEUTROPHILS ABSOLUTE: 4.35 E9/L (ref 1.8–7.3)
NEUTROPHILS RELATIVE PERCENT: 61.6 % (ref 43–80)
PDW BLD-RTO: 13.2 FL (ref 11.5–15)
PLATELET # BLD: 195 E9/L (ref 130–450)
PMV BLD AUTO: 10.2 FL (ref 7–12)
POTASSIUM SERPL-SCNC: 3.9 MMOL/L (ref 3.5–5)
RBC # BLD: 5.05 E12/L (ref 3.5–5.5)
SODIUM BLD-SCNC: 140 MMOL/L (ref 132–146)
TOTAL IRON BINDING CAPACITY: 371 MCG/DL (ref 250–450)
TOTAL PROTEIN: 7.3 G/DL (ref 6.4–8.3)
WBC # BLD: 7.1 E9/L (ref 4.5–11.5)

## 2022-06-21 PROCEDURE — 82728 ASSAY OF FERRITIN: CPT

## 2022-06-21 PROCEDURE — 83540 ASSAY OF IRON: CPT

## 2022-06-21 PROCEDURE — 36415 COLL VENOUS BLD VENIPUNCTURE: CPT

## 2022-06-21 PROCEDURE — 83550 IRON BINDING TEST: CPT

## 2022-06-21 PROCEDURE — 85025 COMPLETE CBC W/AUTO DIFF WBC: CPT

## 2022-06-21 PROCEDURE — 80053 COMPREHEN METABOLIC PANEL: CPT

## 2022-06-22 ENCOUNTER — HOSPITAL ENCOUNTER (OUTPATIENT)
Dept: INFUSION THERAPY | Age: 22
End: 2022-06-22
Payer: COMMERCIAL

## 2022-06-23 ENCOUNTER — OFFICE VISIT (OUTPATIENT)
Dept: ONCOLOGY | Age: 22
End: 2022-06-23
Payer: COMMERCIAL

## 2022-06-23 ENCOUNTER — HOSPITAL ENCOUNTER (OUTPATIENT)
Dept: INFUSION THERAPY | Age: 22
Discharge: HOME | End: 2022-06-23

## 2022-06-23 VITALS
SYSTOLIC BLOOD PRESSURE: 122 MMHG | HEIGHT: 62 IN | RESPIRATION RATE: 98 BRPM | BODY MASS INDEX: 22.03 KG/M2 | TEMPERATURE: 98.1 F | HEART RATE: 91 BPM | DIASTOLIC BLOOD PRESSURE: 80 MMHG | WEIGHT: 119.7 LBS

## 2022-06-23 DIAGNOSIS — D50.9 IRON DEFICIENCY ANEMIA, UNSPECIFIED IRON DEFICIENCY ANEMIA TYPE: Primary | ICD-10-CM

## 2022-06-23 PROCEDURE — 99214 OFFICE O/P EST MOD 30 MIN: CPT | Performed by: INTERNAL MEDICINE

## 2022-06-23 PROCEDURE — 99212 OFFICE O/P EST SF 10 MIN: CPT

## 2022-06-23 NOTE — PROGRESS NOTES
Department of [facility] Oncology  Attending Clinic Note    Reason for Visit: Follow-up on a patient with Iron deficiency anemia and alpha Thalassemia    PCP:  Gulshan Mantilla MD    History of Present Illness:  21 y/o female with hx of constipation who was noted to have microcytic anemia on routine blood work. On 10/26/2021: Hb 5.9  Hct 17.7  MCV 53.1    WBC 4.8  Peripheral blood smear:   Microcytic hypochromic anemia with polychromasia, suggestive of iron deficiency. Unremarkable platelets and white blood cells  Retic 1.3 (0.4-1.9%)  Fe 10 TIBC 461 FeSat 2%  Transferrin 391 (200-360)  Ferritin 3  Given 1 unit prbc 10/26/2021  On 10/28/2021: Hb 7.8  Hct 29.1  MCV 58.3    WBC 4.0  Hb 8.5 on 11/03/2021. She has been on FeSO4 325 mg po TID with improvement in her energy level. On 11/11/2021: Hb 9.9  Hct 35.3  MCV 68.1  WBC 5.4    Peripheral blood smear:  Microcytic anemia with absolute red cell count within normal range. Dimorphic population of red cells noted, which may indicate recent red blood cell transfusion. Marked anisocytosis is present, with moderate poikilocytosis and hypochromia.    Rare schistocytes, target cells, and teardrop cells are all present. Negative for rouleaux formation. White blood cells and platelets (counts and morphology) are within normal limits. BUN 11 Creat 0.8  LFTs wnl  Fe 176 TIBC 357 FeSat 49% Ferritin 82   Epo 13 (4-27)  Hemolytic work-up Negative  Zinc, VitB6, Copper WNL  SPEP: Normal. No monoclonal protein identified  SIFE: Normal. No monoclonal protein identified.      Sickle cell screen: Positive  Hemoglobin electrophoresis: Alpha (+) thalassemia (silent carrier) state (-a/aa)  Genetic counseling    Peripheral blood flow cytometry noted no immunophenotypic evidence of acute leukemia or a T-cell or B-cell neoplasm  Peripheral blood MDS FISH: Negative Study  JAK2 Mutation Not detected    Microcytic anemia combination of iron deficiency anemia and alpha thalassemia  Changed FeSO4 325 mg to bid  On 01/27/2022:  Hb 12.4  Hct 39.9  MCV 82.6  WBC 6.9    Fe 149 TIBC 280 FeSat 53% Ferritin 34  Changed FeSO4 325 mg to po once daily    On 03/15/2022:   Hb 14.1  Hct 43.3  MCV 80.8   WBC 6.6     Fe 246 TIBC 367  FeSat 67% Ferritin 41  D/C FeSO4 325 mg once daily    On 04/27/2022:  Hb 12.9   Hct 40.9  MCV 83.6   WBC 7.5     Fe 125 TIBC 281  FeSat 44%  Ferritin 37  Continued to be off FeSO4    Today 06/23/2022. No fever, chills. Fair appetite and energy level. Review of Systems;  CONSTITUTIONAL: No fever, chills. Fair appetite and energy level  ENMT: Eyes: No diplopia; Nose: No epistaxis. Mouth: No sore throat. RESPIRATORY: No hemoptysis, shortness of breath, cough. CARDIOVASCULAR: No chest pain, palpitations. GASTROINTESTINAL: No N/V  GENITOURINARY: No dysuria, urinary frequency, hematuria. NEURO: No syncope, presyncope, headache. Remainder:ROS NEGATIVE    Past Medical History:      Diagnosis Date    Anemia     Constipation     History of blood transfusion      Medications:  Reviewed and reconciled. Allergies: Allergies   Allergen Reactions    Other Swelling     Oragel-localized swelling     Physical Exam:  /80   Pulse 91   Temp 98.1 °F (36.7 °C)   Resp (!) 98   Ht 5' 2\" (1.575 m)   Wt 119 lb 11.2 oz (54.3 kg)   BMI 21.89 kg/m²   GENERAL: Alert, oriented x 3, not in distress. HEENT: PERRLA; EOMI. Oropharynx clear. EXTREMITIES: Without clubbing, cyanosis, or edema. NEUROLOGIC: No focal deficits.      Lab Results   Component Value Date    WBC 7.1 06/21/2022    HGB 13.5 06/21/2022    HCT 41.7 06/21/2022    MCV 82.6 06/21/2022     06/21/2022     Lab Results   Component Value Date     06/21/2022    K 3.9 06/21/2022     06/21/2022    CO2 24 06/21/2022    BUN 11 06/21/2022    CREATININE 0.8 06/21/2022    GLUCOSE 113 (H) 06/21/2022    CALCIUM 9.2 06/21/2022    PROT 7.3 06/21/2022    LABALBU 4.2 06/21/2022    BILITOT <0.2 06/21/2022    ALKPHOS 51 06/21/2022    AST 16 06/21/2022    ALT 16 06/21/2022    LABGLOM >60 06/21/2022    GFRAA >60 06/21/2022     Lab Results   Component Value Date    IRON 129 06/21/2022    TIBC 371 06/21/2022    FERRITIN 15 06/21/2022     Impression/Plan:  23 y/o female with hx of constipation who was noted to have microcytic anemia on routine blood work. On 10/26/2021: Hb 5.9  Hct 17.7  MCV 53.1    WBC 4.8  Peripheral blood smear:   Microcytic hypochromic anemia with polychromasia, suggestive of iron deficiency. Unremarkable platelets and white blood cells  Retic 1.3 (0.4-1.9%)  Fe 10 TIBC 461 FeSat 2%  Transferrin 391 (200-360)  Ferritin 3  Given 1 unit prbc 10/26/2021  On 10/28/2021: Hb 7.8  Hct 29.1  MCV 58.3    WBC 4.0  Hb 8.5 on 11/03/2021. She has been on FeSO4 325 mg po TID with improvement in her energy level. On 11/11/2021: Hb 9.9  Hct 35.3  MCV 68.1  WBC 5.4    Peripheral blood smear:  Microcytic anemia with absolute red cell count within normal range. Dimorphic population of red cells noted, which may indicate recent red blood cell transfusion. Marked anisocytosis is present, with moderate poikilocytosis and hypochromia.    Rare schistocytes, target cells, and teardrop cells are all present. Negative for rouleaux formation. White blood cells and platelets (counts and morphology) are within normal limits. BUN 11 Creat 0.8  LFTs wnl  Fe 176 TIBC 357 FeSat 49% Ferritin 82   Epo 13 (4-27)  Hemolytic work-up Negative  Zinc, VitB6, Copper WNL  SPEP: Normal. No monoclonal protein identified  SIFE: Normal. No monoclonal protein identified.      Sickle cell screen: Positive  Hemoglobin electrophoresis: Alpha (+) thalassemia (silent carrier) state (-a/aa)  Genetic counseling recommended and ordered    Peripheral blood flow cytometry noted no immunophenotypic evidence of acute leukemia or a T-cell or B-cell neoplasm  Peripheral blood MDS FISH: Negative Study  JAK2 Mutation Not detected    Microcytic anemia combination of iron deficiency anemia and alpha thalassemia  Changed FeSO4 325 mg to bid    On 01/27/2022:  Hb 12.4  Hct 39.9  MCV 82.6  WBC 6.9    Fe 149 TIBC 280 FeSat 53% Ferritin 34  Changed FeSO4 325 mg to po once daily    On 03/15/2022:   Hb 14.1  Hct 43.3  MCV 80.8   WBC 6.6     Fe 246 TIBC 367  FeSat 67% Ferritin 41  D/C FeSO4 325 mg once daily    On 04/27/2022:  Hb 12.9   Hct 40.9  MCV 83.6   WBC 7.5     Fe 125 TIBC 281  FeSat 44%  Ferritin 37  Continued to be off FeSO4    On 06/21/2022:  Hb 13.5   Hct 41.7  MCV 82.6   WBC 7.1     Fe 129 TIBC 371  FeSat 35%  Ferritin 15  Labs reviewed  Continue to be off FeSO4    RTC 2 months with prior labs. If Ferritin drops further we will get her back on oral FeSO4.  Glucose 113; dietary changes and follow with PCP      Elvia Davila MD   6/23/2022

## 2022-08-25 DIAGNOSIS — D50.9 IRON DEFICIENCY ANEMIA, UNSPECIFIED IRON DEFICIENCY ANEMIA TYPE: Primary | ICD-10-CM

## 2022-08-26 ENCOUNTER — HOSPITAL ENCOUNTER (OUTPATIENT)
Dept: INFUSION THERAPY | Age: 22
Discharge: HOME | End: 2022-08-26

## 2022-08-26 DIAGNOSIS — D50.9 IRON DEFICIENCY ANEMIA, UNSPECIFIED IRON DEFICIENCY ANEMIA TYPE: ICD-10-CM

## 2022-08-26 LAB
ALBUMIN SERPL-MCNC: 4.1 G/DL (ref 3.5–5.2)
ALP BLD-CCNC: 45 U/L (ref 35–104)
ALT SERPL-CCNC: 16 U/L (ref 0–32)
ANION GAP SERPL CALCULATED.3IONS-SCNC: 11 MMOL/L (ref 7–16)
AST SERPL-CCNC: 20 U/L (ref 0–31)
BASOPHILS ABSOLUTE: 0.05 E9/L (ref 0–0.2)
BASOPHILS RELATIVE PERCENT: 0.6 % (ref 0–2)
BILIRUB SERPL-MCNC: 0.3 MG/DL (ref 0–1.2)
BUN BLDV-MCNC: 12 MG/DL (ref 6–20)
CALCIUM SERPL-MCNC: 9.4 MG/DL (ref 8.6–10.2)
CHLORIDE BLD-SCNC: 103 MMOL/L (ref 98–107)
CO2: 21 MMOL/L (ref 22–29)
CREAT SERPL-MCNC: 0.8 MG/DL (ref 0.5–1)
EOSINOPHILS ABSOLUTE: 0.06 E9/L (ref 0.05–0.5)
EOSINOPHILS RELATIVE PERCENT: 0.7 % (ref 0–6)
FERRITIN: 14 NG/ML
GFR AFRICAN AMERICAN: >60
GFR NON-AFRICAN AMERICAN: >60 ML/MIN/1.73
GLUCOSE BLD-MCNC: 84 MG/DL (ref 74–99)
HCT VFR BLD CALC: 41.7 % (ref 34–48)
HEMOGLOBIN: 14 G/DL (ref 11.5–15.5)
IMMATURE GRANULOCYTES #: 0.02 E9/L
IMMATURE GRANULOCYTES %: 0.2 % (ref 0–5)
IRON SATURATION: 37 % (ref 15–50)
IRON: 160 MCG/DL (ref 37–145)
LYMPHOCYTES ABSOLUTE: 2.18 E9/L (ref 1.5–4)
LYMPHOCYTES RELATIVE PERCENT: 26.5 % (ref 20–42)
MCH RBC QN AUTO: 27.3 PG (ref 26–35)
MCHC RBC AUTO-ENTMCNC: 33.6 % (ref 32–34.5)
MCV RBC AUTO: 81.4 FL (ref 80–99.9)
MONOCYTES ABSOLUTE: 0.65 E9/L (ref 0.1–0.95)
MONOCYTES RELATIVE PERCENT: 7.9 % (ref 2–12)
NEUTROPHILS ABSOLUTE: 5.26 E9/L (ref 1.8–7.3)
NEUTROPHILS RELATIVE PERCENT: 64.1 % (ref 43–80)
PDW BLD-RTO: 13.4 FL (ref 11.5–15)
PLATELET # BLD: 241 E9/L (ref 130–450)
PMV BLD AUTO: 10.4 FL (ref 7–12)
POTASSIUM SERPL-SCNC: 4.3 MMOL/L (ref 3.5–5)
RBC # BLD: 5.12 E12/L (ref 3.5–5.5)
SODIUM BLD-SCNC: 135 MMOL/L (ref 132–146)
TOTAL IRON BINDING CAPACITY: 432 MCG/DL (ref 250–450)
TOTAL PROTEIN: 7.7 G/DL (ref 6.4–8.3)
WBC # BLD: 8.2 E9/L (ref 4.5–11.5)

## 2022-08-26 PROCEDURE — 83540 ASSAY OF IRON: CPT

## 2022-08-26 PROCEDURE — 85025 COMPLETE CBC W/AUTO DIFF WBC: CPT

## 2022-08-26 PROCEDURE — 83550 IRON BINDING TEST: CPT

## 2022-08-26 PROCEDURE — 80053 COMPREHEN METABOLIC PANEL: CPT

## 2022-08-26 PROCEDURE — 36415 COLL VENOUS BLD VENIPUNCTURE: CPT

## 2022-08-26 PROCEDURE — 82728 ASSAY OF FERRITIN: CPT

## 2022-08-29 ENCOUNTER — OFFICE VISIT (OUTPATIENT)
Dept: ONCOLOGY | Age: 22
End: 2022-08-29
Payer: COMMERCIAL

## 2022-08-29 ENCOUNTER — HOSPITAL ENCOUNTER (OUTPATIENT)
Dept: INFUSION THERAPY | Age: 22
Discharge: HOME | End: 2022-08-29

## 2022-08-29 VITALS
HEART RATE: 126 BPM | TEMPERATURE: 97.2 F | RESPIRATION RATE: 16 BRPM | WEIGHT: 121 LBS | HEIGHT: 62 IN | DIASTOLIC BLOOD PRESSURE: 74 MMHG | BODY MASS INDEX: 22.26 KG/M2 | SYSTOLIC BLOOD PRESSURE: 119 MMHG | OXYGEN SATURATION: 96 %

## 2022-08-29 DIAGNOSIS — D50.9 IRON DEFICIENCY ANEMIA, UNSPECIFIED IRON DEFICIENCY ANEMIA TYPE: Primary | ICD-10-CM

## 2022-08-29 PROCEDURE — 99212 OFFICE O/P EST SF 10 MIN: CPT

## 2022-08-29 PROCEDURE — 99213 OFFICE O/P EST LOW 20 MIN: CPT | Performed by: INTERNAL MEDICINE

## 2022-08-29 NOTE — PROGRESS NOTES
Department of [facility] Oncology  Attending Clinic Note    Reason for Visit: Follow-up on a patient with Iron deficiency anemia and alpha Thalassemia    PCP:  Akosua Limon MD    History of Present Illness:  21 y/o female with hx of constipation who was noted to have microcytic anemia on routine blood work. On 10/26/2021: Hb 5.9  Hct 17.7  MCV 53.1    WBC 4.8  Peripheral blood smear:   Microcytic hypochromic anemia with polychromasia, suggestive of iron deficiency. Unremarkable platelets and white blood cells  Retic 1.3 (0.4-1.9%)  Fe 10 TIBC 461 FeSat 2%  Transferrin 391 (200-360)  Ferritin 3  Given 1 unit prbc 10/26/2021  On 10/28/2021: Hb 7.8  Hct 29.1  MCV 58.3    WBC 4.0  Hb 8.5 on 11/03/2021. She has been on FeSO4 325 mg po TID with improvement in her energy level. On 11/11/2021: Hb 9.9  Hct 35.3  MCV 68.1  WBC 5.4    Peripheral blood smear:  Microcytic anemia with absolute red cell count within normal range. Dimorphic population of red cells noted, which may indicate recent red blood cell transfusion. Marked anisocytosis is present, with moderate poikilocytosis and hypochromia. Rare schistocytes, target cells, and teardrop cells are all present. Negative for rouleaux formation. White blood cells and platelets (counts and morphology) are within normal limits. BUN 11 Creat 0.8  LFTs wnl  Fe 176 TIBC 357 FeSat 49% Ferritin 82   Epo 13 (4-27)  Hemolytic work-up Negative  Zinc, VitB6, Copper WNL  SPEP: Normal. No monoclonal protein identified  SIFE: Normal. No monoclonal protein identified.      Sickle cell screen: Positive  Hemoglobin electrophoresis: Alpha (+) thalassemia (silent carrier) state (-a/aa)  Genetic counseling    Peripheral blood flow cytometry noted no immunophenotypic evidence of acute leukemia or a T-cell or B-cell neoplasm  Peripheral blood MDS FISH: Negative Study  JAK2 Mutation Not detected    Microcytic anemia combination of iron deficiency anemia and alpha thalassemia  Changed FeSO4 325 mg to bid  On 01/27/2022:  Hb 12.4  Hct 39.9  MCV 82.6  WBC 6.9    Fe 149 TIBC 280 FeSat 53% Ferritin 34  Changed FeSO4 325 mg to po once daily    On 03/15/2022:   Hb 14.1  Hct 43.3  MCV 80.8   WBC 6.6     Fe 246 TIBC 367  FeSat 67% Ferritin 41  D/C FeSO4 325 mg once daily    On 04/27/2022:  Hb 12.9   Hct 40.9  MCV 83.6   WBC 7.5     Fe 125 TIBC 281  FeSat 44%  Ferritin 37  Continued to be off FeSO4    On 06/21/2022:  Hb 13.5   Hct 41.7  MCV 82.6   WBC 7.1     Fe 129 TIBC 371  FeSat 35%  Ferritin 15  Continued to be off FeSO4    Today 08/29/2022. No fever, chills. Fair appetite and energy level. No bleeding noted. Review of Systems;  CONSTITUTIONAL: No fever, chills. Fair appetite and energy level  ENMT: Eyes: No diplopia; Nose: No epistaxis. Mouth: No sore throat. RESPIRATORY: No hemoptysis, shortness of breath, cough. CARDIOVASCULAR: No chest pain, palpitations. GASTROINTESTINAL: No N/V abdominal pain  GENITOURINARY: No dysuria, urinary frequency, hematuria. NEURO: No syncope, presyncope, headache. Remainder:ROS NEGATIVE    Past Medical History:      Diagnosis Date    Anemia     Constipation     History of blood transfusion      Medications:  Reviewed and reconciled. Allergies: Allergies   Allergen Reactions    Other Swelling     Oragel-localized swelling     Physical Exam:  /74   Pulse (!) 126   Temp 97.2 °F (36.2 °C) (Infrared)   Resp 16   Ht 5' 2\" (1.575 m)   Wt 121 lb (54.9 kg)   SpO2 96%   BMI 22.13 kg/m²   GENERAL: Alert, oriented x 3, not in distress. HEENT: PERRLA; EOMI. Oropharynx clear. EXTREMITIES: Without clubbing, cyanosis, or edema. NEUROLOGIC: No focal deficits.      Lab Results   Component Value Date    WBC 8.2 08/26/2022    HGB 14.0 08/26/2022    HCT 41.7 08/26/2022    MCV 81.4 08/26/2022     08/26/2022     Lab Results   Component Value Date     08/26/2022    K 4.3 08/26/2022  08/26/2022    CO2 21 (L) 08/26/2022    BUN 12 08/26/2022    CREATININE 0.8 08/26/2022    GLUCOSE 84 08/26/2022    CALCIUM 9.4 08/26/2022    PROT 7.7 08/26/2022    LABALBU 4.1 08/26/2022    BILITOT 0.3 08/26/2022    ALKPHOS 45 08/26/2022    AST 20 08/26/2022    ALT 16 08/26/2022    LABGLOM >60 08/26/2022    GFRAA >60 08/26/2022     Lab Results   Component Value Date    IRON 160 (H) 08/26/2022    TIBC 432 08/26/2022    FERRITIN 14 08/26/2022     Impression/Plan:  21 y/o female with hx of constipation who was noted to have microcytic anemia on routine blood work. On 10/26/2021: Hb 5.9  Hct 17.7  MCV 53.1    WBC 4.8  Peripheral blood smear:   Microcytic hypochromic anemia with polychromasia, suggestive of iron deficiency. Unremarkable platelets and white blood cells  Retic 1.3 (0.4-1.9%)  Fe 10 TIBC 461 FeSat 2%  Transferrin 391 (200-360)  Ferritin 3  Given 1 unit prbc 10/26/2021  On 10/28/2021: Hb 7.8  Hct 29.1  MCV 58.3    WBC 4.0  Hb 8.5 on 11/03/2021. She has been on FeSO4 325 mg po TID with improvement in her energy level. On 11/11/2021: Hb 9.9  Hct 35.3  MCV 68.1  WBC 5.4    Peripheral blood smear:  Microcytic anemia with absolute red cell count within normal range. Dimorphic population of red cells noted, which may indicate recent red blood cell transfusion. Marked anisocytosis is present, with moderate poikilocytosis and hypochromia. Rare schistocytes, target cells, and teardrop cells are all present. Negative for rouleaux formation. White blood cells and platelets (counts and morphology) are within normal limits. BUN 11 Creat 0.8  LFTs wnl  Fe 176 TIBC 357 FeSat 49% Ferritin 82   Epo 13 (4-27)  Hemolytic work-up Negative  Zinc, VitB6, Copper WNL  SPEP: Normal. No monoclonal protein identified  SIFE: Normal. No monoclonal protein identified.      Sickle cell screen: Positive  Hemoglobin electrophoresis: Alpha (+) thalassemia (silent carrier) state (-a/aa)  Genetic counseling recommended and ordered    Peripheral blood flow cytometry noted no immunophenotypic evidence of acute leukemia or a T-cell or B-cell neoplasm  Peripheral blood MDS FISH: Negative Study  JAK2 Mutation Not detected    Microcytic anemia combination of iron deficiency anemia and alpha thalassemia  Changed FeSO4 325 mg to bid    On 01/27/2022:  Hb 12.4  Hct 39.9  MCV 82.6  WBC 6.9    Fe 149 TIBC 280 FeSat 53% Ferritin 34  Changed FeSO4 325 mg to po once daily    On 03/15/2022:   Hb 14.1  Hct 43.3  MCV 80.8   WBC 6.6     Fe 246 TIBC 367  FeSat 67% Ferritin 41  D/C FeSO4 325 mg once daily    On 04/27/2022:  Hb 12.9   Hct 40.9  MCV 83.6   WBC 7.5     Fe 125 TIBC 281  FeSat 44%  Ferritin 37  Continued to be off FeSO4    On 06/21/2022:  Hb 13.5   Hct 41.7  MCV 82.6   WBC 7.1     Fe 129 TIBC 371  FeSat 35%  Ferritin 15  Continued to be off FeSO4    On 08/26/2022:  Hb 14.0   Hct 41.7  MCV 81.4   WBC 8.2     Fe 160 TIBC 432  FeSat 37%  Ferritin 14  Labs reviewed. Continue to be off FeSO4    RTC 2 months with prior labs. If Ferritin drops further we will get her back on oral FeSO4.      Tuyet Franklin MD   8/29/2022

## 2022-10-25 ENCOUNTER — HOSPITAL ENCOUNTER (OUTPATIENT)
Dept: INFUSION THERAPY | Age: 22
End: 2022-10-25

## 2022-10-26 ENCOUNTER — HOSPITAL ENCOUNTER (OUTPATIENT)
Dept: INFUSION THERAPY | Age: 22
End: 2022-10-26

## 2022-10-28 ENCOUNTER — HOSPITAL ENCOUNTER (OUTPATIENT)
Dept: INFUSION THERAPY | Age: 22
Discharge: HOME | End: 2022-10-28
Payer: COMMERCIAL

## 2022-10-28 DIAGNOSIS — D50.9 IRON DEFICIENCY ANEMIA, UNSPECIFIED IRON DEFICIENCY ANEMIA TYPE: ICD-10-CM

## 2022-10-28 LAB
ALBUMIN SERPL-MCNC: 4 G/DL (ref 3.5–5.2)
ALP BLD-CCNC: 48 U/L (ref 35–104)
ALT SERPL-CCNC: 13 U/L (ref 0–32)
ANION GAP SERPL CALCULATED.3IONS-SCNC: 8 MMOL/L (ref 7–16)
AST SERPL-CCNC: 16 U/L (ref 0–31)
BASOPHILS ABSOLUTE: 0.06 E9/L (ref 0–0.2)
BASOPHILS RELATIVE PERCENT: 0.7 % (ref 0–2)
BILIRUB SERPL-MCNC: <0.2 MG/DL (ref 0–1.2)
BUN BLDV-MCNC: 13 MG/DL (ref 6–20)
CALCIUM SERPL-MCNC: 9.6 MG/DL (ref 8.6–10.2)
CHLORIDE BLD-SCNC: 102 MMOL/L (ref 98–107)
CO2: 24 MMOL/L (ref 22–29)
CREAT SERPL-MCNC: 0.8 MG/DL (ref 0.5–1)
EOSINOPHILS ABSOLUTE: 0.11 E9/L (ref 0.05–0.5)
EOSINOPHILS RELATIVE PERCENT: 1.3 % (ref 0–6)
FERRITIN: 7 NG/ML
GFR SERPL CREATININE-BSD FRML MDRD: >60 ML/MIN/1.73
GLUCOSE BLD-MCNC: 94 MG/DL (ref 74–99)
HCT VFR BLD CALC: 41.9 % (ref 34–48)
HEMOGLOBIN: 13.6 G/DL (ref 11.5–15.5)
IMMATURE GRANULOCYTES #: 0.02 E9/L
IMMATURE GRANULOCYTES %: 0.2 % (ref 0–5)
IRON SATURATION: 14 % (ref 15–50)
IRON: 60 MCG/DL (ref 37–145)
LYMPHOCYTES ABSOLUTE: 1.96 E9/L (ref 1.5–4)
LYMPHOCYTES RELATIVE PERCENT: 23.2 % (ref 20–42)
MCH RBC QN AUTO: 27 PG (ref 26–35)
MCHC RBC AUTO-ENTMCNC: 32.5 % (ref 32–34.5)
MCV RBC AUTO: 83.3 FL (ref 80–99.9)
MONOCYTES ABSOLUTE: 0.6 E9/L (ref 0.1–0.95)
MONOCYTES RELATIVE PERCENT: 7.1 % (ref 2–12)
NEUTROPHILS ABSOLUTE: 5.69 E9/L (ref 1.8–7.3)
NEUTROPHILS RELATIVE PERCENT: 67.5 % (ref 43–80)
PDW BLD-RTO: 13.2 FL (ref 11.5–15)
PLATELET # BLD: 207 E9/L (ref 130–450)
PMV BLD AUTO: 9.5 FL (ref 7–12)
POTASSIUM SERPL-SCNC: 4.3 MMOL/L (ref 3.5–5)
RBC # BLD: 5.03 E12/L (ref 3.5–5.5)
SODIUM BLD-SCNC: 134 MMOL/L (ref 132–146)
TOTAL IRON BINDING CAPACITY: 436 MCG/DL (ref 250–450)
TOTAL PROTEIN: 7.2 G/DL (ref 6.4–8.3)
WBC # BLD: 8.4 E9/L (ref 4.5–11.5)

## 2022-10-28 PROCEDURE — 85025 COMPLETE CBC W/AUTO DIFF WBC: CPT

## 2022-10-28 PROCEDURE — 36415 COLL VENOUS BLD VENIPUNCTURE: CPT

## 2022-10-28 PROCEDURE — 82728 ASSAY OF FERRITIN: CPT

## 2022-10-28 PROCEDURE — 83550 IRON BINDING TEST: CPT

## 2022-10-28 PROCEDURE — 80053 COMPREHEN METABOLIC PANEL: CPT

## 2022-10-28 PROCEDURE — 83540 ASSAY OF IRON: CPT

## 2022-10-31 ENCOUNTER — HOSPITAL ENCOUNTER (OUTPATIENT)
Dept: INFUSION THERAPY | Age: 22
Discharge: HOME | End: 2022-10-31

## 2022-10-31 ENCOUNTER — OFFICE VISIT (OUTPATIENT)
Dept: ONCOLOGY | Age: 22
End: 2022-10-31
Payer: COMMERCIAL

## 2022-10-31 VITALS
DIASTOLIC BLOOD PRESSURE: 83 MMHG | WEIGHT: 117.4 LBS | BODY MASS INDEX: 21.6 KG/M2 | HEIGHT: 62 IN | TEMPERATURE: 97.3 F | HEART RATE: 122 BPM | SYSTOLIC BLOOD PRESSURE: 123 MMHG | OXYGEN SATURATION: 100 %

## 2022-10-31 DIAGNOSIS — D50.9 IRON DEFICIENCY ANEMIA, UNSPECIFIED IRON DEFICIENCY ANEMIA TYPE: Primary | ICD-10-CM

## 2022-10-31 PROCEDURE — 99212 OFFICE O/P EST SF 10 MIN: CPT

## 2022-10-31 PROCEDURE — 99214 OFFICE O/P EST MOD 30 MIN: CPT | Performed by: INTERNAL MEDICINE

## 2022-10-31 RX ORDER — DEXTROAMPHETAMINE SACCHARATE, AMPHETAMINE ASPARTATE, DEXTROAMPHETAMINE SULFATE AND AMPHETAMINE SULFATE 7.5; 7.5; 7.5; 7.5 MG/1; MG/1; MG/1; MG/1
TABLET ORAL
COMMUNITY
Start: 2022-09-14

## 2022-10-31 NOTE — PROGRESS NOTES
Department of [facility] Oncology   Attending Clinic Note    Reason for Visit: Follow-up on a patient with Iron deficiency anemia and alpha Thalassemia    PCP:  Laine Baptiste MD    History of Present Illness:  26 y/o female with hx of constipation who was noted to have microcytic anemia on routine blood work. On 10/26/2021: Hb 5.9  Hct 17.7  MCV 53.1    WBC 4.8  Peripheral blood smear:   Microcytic hypochromic anemia with polychromasia, suggestive of iron deficiency. Unremarkable platelets and white blood cells  Retic 1.3 (0.4-1.9%)  Fe 10 TIBC 461 FeSat 2%  Transferrin 391 (200-360)  Ferritin 3  Given 1 unit prbc 10/26/2021  On 10/28/2021: Hb 7.8  Hct 29.1  MCV 58.3    WBC 4.0  Hb 8.5 on 11/03/2021. She has been on FeSO4 325 mg po TID with improvement in her energy level. On 11/11/2021: Hb 9.9  Hct 35.3  MCV 68.1  WBC 5.4    Peripheral blood smear:  Microcytic anemia with absolute red cell count within normal range. Dimorphic population of red cells noted, which may indicate recent red blood cell transfusion. Marked anisocytosis is present, with moderate poikilocytosis and hypochromia. Rare schistocytes, target cells, and teardrop cells are all present. Negative for rouleaux formation. White blood cells and platelets (counts and morphology) are within normal limits. BUN 11 Creat 0.8  LFTs wnl  Fe 176 TIBC 357 FeSat 49% Ferritin 82   Epo 13 (4-27)  Hemolytic work-up Negative  Zinc, VitB6, Copper WNL  SPEP: Normal. No monoclonal protein identified  SIFE: Normal. No monoclonal protein identified.      Sickle cell screen: Positive  Hemoglobin electrophoresis: Alpha (+) thalassemia (silent carrier) state (-a/aa)  Genetic counseling    Peripheral blood flow cytometry noted no immunophenotypic evidence of acute leukemia or a T-cell or B-cell neoplasm  Peripheral blood MDS FISH: Negative Study  JAK2 Mutation Not detected    Microcytic anemia combination of iron deficiency anemia and alpha thalassemia  Changed FeSO4 325 mg to bid  On 01/27/2022:  Hb 12.4  Hct 39.9  MCV 82.6  WBC 6.9    Fe 149 TIBC 280 FeSat 53% Ferritin 34  Changed FeSO4 325 mg to po once daily    On 03/15/2022:   Hb 14.1  Hct 43.3  MCV 80.8   WBC 6.6     Fe 246 TIBC 367  FeSat 67% Ferritin 41  D/C FeSO4 325 mg once daily    On 04/27/2022:  Hb 12.9   Hct 40.9  MCV 83.6   WBC 7.5     Fe 125 TIBC 281  FeSat 44%  Ferritin 37  Continued to be off FeSO4    On 06/21/2022:  Hb 13.5   Hct 41.7  MCV 82.6   WBC 7.1     Fe 129 TIBC 371  FeSat 35%  Ferritin 15  Continued to be off FeSO4    On 08/26/2022:  Hb 14.0   Hct 41.7  MCV 81.4   WBC 8.2     Fe 160 TIBC 432  FeSat 37%  Ferritin 14  Continued to be off FeSO4    Today 10/31/2022. No fever, chills. Fair appetite and energy level. No bleeding noted. Review of Systems;  CONSTITUTIONAL: No fever, chills. Fair appetite and energy level  ENMT: Eyes: No diplopia; Nose: No epistaxis. Mouth: No sore throat. RESPIRATORY: No hemoptysis, shortness of breath, cough. CARDIOVASCULAR: No chest pain, palpitations. GASTROINTESTINAL: No N/V abdominal pain  GENITOURINARY: No dysuria, urinary frequency, hematuria. NEURO: No syncope, presyncope, headache. Remainder:ROS NEGATIVE    Past Medical History:      Diagnosis Date    Anemia     Constipation     History of blood transfusion      Medications:  Reviewed and reconciled. Allergies: Allergies   Allergen Reactions    Other Swelling     Oragel-localized swelling     Physical Exam:  /83   Pulse (!) 122   Temp 97.3 °F (36.3 °C)   Ht 5' 2\" (1.575 m)   Wt 117 lb 6.4 oz (53.3 kg)   SpO2 100%   BMI 21.47 kg/m²   GENERAL: Alert, oriented x 3, not in distress. HEENT: PERRLA; EOMI. Oropharynx clear. LUNGS: CTA Deepak  CVS: RRR  EXTREMITIES: Without clubbing, cyanosis, or edema. NEUROLOGIC: No focal deficits.      Lab Results   Component Value Date    WBC 8.4 10/28/2022    HGB 13.6 10/28/2022 HCT 41.9 10/28/2022    MCV 83.3 10/28/2022     10/28/2022     Lab Results   Component Value Date     10/28/2022    K 4.3 10/28/2022     10/28/2022    CO2 24 10/28/2022    BUN 13 10/28/2022    CREATININE 0.8 10/28/2022    GLUCOSE 94 10/28/2022    CALCIUM 9.6 10/28/2022    PROT 7.2 10/28/2022    LABALBU 4.0 10/28/2022    BILITOT <0.2 10/28/2022    ALKPHOS 48 10/28/2022    AST 16 10/28/2022    ALT 13 10/28/2022    LABGLOM >60 10/28/2022    GFRAA >60 08/26/2022     Lab Results   Component Value Date    IRON 60 10/28/2022    TIBC 436 10/28/2022    FERRITIN 7 10/28/2022     Impression/Plan:  21 y/o female with hx of constipation who was noted to have microcytic anemia on routine blood work. On 10/26/2021: Hb 5.9  Hct 17.7  MCV 53.1    WBC 4.8  Peripheral blood smear:   Microcytic hypochromic anemia with polychromasia, suggestive of iron deficiency. Unremarkable platelets and white blood cells  Retic 1.3 (0.4-1.9%)  Fe 10 TIBC 461 FeSat 2%  Transferrin 391 (200-360)  Ferritin 3  Given 1 unit prbc 10/26/2021  On 10/28/2021: Hb 7.8  Hct 29.1  MCV 58.3    WBC 4.0  Hb 8.5 on 11/03/2021. She has been on FeSO4 325 mg po TID with improvement in her energy level. On 11/11/2021: Hb 9.9  Hct 35.3  MCV 68.1  WBC 5.4    Peripheral blood smear:  Microcytic anemia with absolute red cell count within normal range. Dimorphic population of red cells noted, which may indicate recent red blood cell transfusion. Marked anisocytosis is present, with moderate poikilocytosis and hypochromia. Rare schistocytes, target cells, and teardrop cells are all present. Negative for rouleaux formation. White blood cells and platelets (counts and morphology) are within normal limits.      BUN 11 Creat 0.8  LFTs wnl  Fe 176 TIBC 357 FeSat 49% Ferritin 82   Epo 13 (4-27)  Hemolytic work-up Negative  Zinc, VitB6, Copper WNL  SPEP: Normal. No monoclonal protein identified  SIFE: Normal. No monoclonal protein identified. Sickle cell screen: Positive  Hemoglobin electrophoresis: Alpha (+) thalassemia (silent carrier) state (-a/aa)  Genetic counseling recommended and ordered    Peripheral blood flow cytometry noted no immunophenotypic evidence of acute leukemia or a T-cell or B-cell neoplasm  Peripheral blood MDS FISH: Negative Study  JAK2 Mutation Not detected    Microcytic anemia combination of iron deficiency anemia and alpha thalassemia  Changed FeSO4 325 mg to bid    On 01/27/2022:  Hb 12.4  Hct 39.9  MCV 82.6  WBC 6.9    Fe 149 TIBC 280 FeSat 53% Ferritin 34  Changed FeSO4 325 mg to po once daily    On 03/15/2022:   Hb 14.1  Hct 43.3  MCV 80.8   WBC 6.6     Fe 246 TIBC 367  FeSat 67% Ferritin 41  D/C FeSO4 325 mg once daily    On 04/27/2022:  Hb 12.9   Hct 40.9  MCV 83.6   WBC 7.5     Fe 125 TIBC 281  FeSat 44%  Ferritin 37  Continued to be off FeSO4    On 06/21/2022:  Hb 13.5   Hct 41.7  MCV 82.6   WBC 7.1     Fe 129 TIBC 371  FeSat 35%  Ferritin 15  Continued to be off FeSO4    On 08/26/2022:  Hb 14.0   Hct 41.7  MCV 81.4   WBC 8.2     Fe 160 TIBC 432  FeSat 37%  Ferritin 14  Continued to be off FeSO4    On 10/28/2022:  Hb 13.6  Hct 41.9   MCV 83.3    WBC 8.4     Fe 60  TIBC 436  FeSat 14%   Ferritin 7  Labs reviewed. Recommended to go back FeSO4 325 mg po once daily.     RTC 1 month with prior labs     Kip Lomas MD   10/31/2022

## 2022-11-21 ENCOUNTER — HOSPITAL ENCOUNTER (OUTPATIENT)
Dept: INFUSION THERAPY | Age: 22
Discharge: HOME | End: 2022-11-21
Payer: COMMERCIAL

## 2022-11-21 DIAGNOSIS — D50.9 IRON DEFICIENCY ANEMIA, UNSPECIFIED IRON DEFICIENCY ANEMIA TYPE: ICD-10-CM

## 2022-11-21 LAB
ALBUMIN SERPL-MCNC: 4.1 G/DL (ref 3.5–5.2)
ALP BLD-CCNC: 56 U/L (ref 35–104)
ALT SERPL-CCNC: 14 U/L (ref 0–32)
ANION GAP SERPL CALCULATED.3IONS-SCNC: 7 MMOL/L (ref 7–16)
AST SERPL-CCNC: 16 U/L (ref 0–31)
BASOPHILS ABSOLUTE: 0.04 E9/L (ref 0–0.2)
BASOPHILS RELATIVE PERCENT: 0.5 % (ref 0–2)
BILIRUB SERPL-MCNC: <0.2 MG/DL (ref 0–1.2)
BUN BLDV-MCNC: 15 MG/DL (ref 6–20)
CALCIUM SERPL-MCNC: 9.7 MG/DL (ref 8.6–10.2)
CHLORIDE BLD-SCNC: 102 MMOL/L (ref 98–107)
CO2: 27 MMOL/L (ref 22–29)
CREAT SERPL-MCNC: 0.7 MG/DL (ref 0.5–1)
EOSINOPHILS ABSOLUTE: 0.07 E9/L (ref 0.05–0.5)
EOSINOPHILS RELATIVE PERCENT: 0.9 % (ref 0–6)
FERRITIN: 14 NG/ML
GFR SERPL CREATININE-BSD FRML MDRD: >60 ML/MIN/1.73
GLUCOSE BLD-MCNC: 108 MG/DL (ref 74–99)
HCT VFR BLD CALC: 42.3 % (ref 34–48)
HEMOGLOBIN: 13.8 G/DL (ref 11.5–15.5)
IMMATURE GRANULOCYTES #: 0.03 E9/L
IMMATURE GRANULOCYTES %: 0.4 % (ref 0–5)
IRON SATURATION: 42 % (ref 15–50)
IRON: 183 MCG/DL (ref 37–145)
LYMPHOCYTES ABSOLUTE: 2.36 E9/L (ref 1.5–4)
LYMPHOCYTES RELATIVE PERCENT: 29 % (ref 20–42)
MCH RBC QN AUTO: 27.1 PG (ref 26–35)
MCHC RBC AUTO-ENTMCNC: 32.6 % (ref 32–34.5)
MCV RBC AUTO: 83.1 FL (ref 80–99.9)
MONOCYTES ABSOLUTE: 0.53 E9/L (ref 0.1–0.95)
MONOCYTES RELATIVE PERCENT: 6.5 % (ref 2–12)
NEUTROPHILS ABSOLUTE: 5.11 E9/L (ref 1.8–7.3)
NEUTROPHILS RELATIVE PERCENT: 62.7 % (ref 43–80)
PDW BLD-RTO: 13.2 FL (ref 11.5–15)
PLATELET # BLD: 224 E9/L (ref 130–450)
PMV BLD AUTO: 10 FL (ref 7–12)
POTASSIUM SERPL-SCNC: 3.9 MMOL/L (ref 3.5–5)
RBC # BLD: 5.09 E12/L (ref 3.5–5.5)
SODIUM BLD-SCNC: 136 MMOL/L (ref 132–146)
TOTAL IRON BINDING CAPACITY: 440 MCG/DL (ref 250–450)
TOTAL PROTEIN: 7.4 G/DL (ref 6.4–8.3)
WBC # BLD: 8.1 E9/L (ref 4.5–11.5)

## 2022-11-21 PROCEDURE — 80053 COMPREHEN METABOLIC PANEL: CPT

## 2022-11-21 PROCEDURE — 83550 IRON BINDING TEST: CPT

## 2022-11-21 PROCEDURE — 85025 COMPLETE CBC W/AUTO DIFF WBC: CPT

## 2022-11-21 PROCEDURE — 83540 ASSAY OF IRON: CPT

## 2022-11-21 PROCEDURE — 82728 ASSAY OF FERRITIN: CPT

## 2022-11-28 ENCOUNTER — OFFICE VISIT (OUTPATIENT)
Dept: ONCOLOGY | Age: 22
End: 2022-11-28
Payer: COMMERCIAL

## 2022-11-28 ENCOUNTER — HOSPITAL ENCOUNTER (OUTPATIENT)
Dept: INFUSION THERAPY | Age: 22
Discharge: HOME | End: 2022-11-28

## 2022-11-28 VITALS
HEIGHT: 62 IN | HEART RATE: 132 BPM | DIASTOLIC BLOOD PRESSURE: 77 MMHG | SYSTOLIC BLOOD PRESSURE: 129 MMHG | BODY MASS INDEX: 22.26 KG/M2 | OXYGEN SATURATION: 100 % | TEMPERATURE: 97.3 F | RESPIRATION RATE: 16 BRPM | WEIGHT: 121 LBS

## 2022-11-28 DIAGNOSIS — D50.9 IRON DEFICIENCY ANEMIA, UNSPECIFIED IRON DEFICIENCY ANEMIA TYPE: Primary | ICD-10-CM

## 2022-11-28 DIAGNOSIS — D56.0 ALPHA THALASSEMIA (HCC): ICD-10-CM

## 2022-11-28 PROCEDURE — 99213 OFFICE O/P EST LOW 20 MIN: CPT | Performed by: INTERNAL MEDICINE

## 2022-11-28 PROCEDURE — 99212 OFFICE O/P EST SF 10 MIN: CPT

## 2022-11-28 NOTE — PROGRESS NOTES
Department of [facility] Oncology   Attending Clinic Note    Reason for Visit: Follow-up on a patient with Iron deficiency anemia and alpha Thalassemia    PCP:  Yael Morales MD    History of Present Illness:  24 y/o female with hx of constipation who was noted to have microcytic anemia on routine blood work. On 10/26/2021: Hb 5.9  Hct 17.7  MCV 53.1    WBC 4.8  Peripheral blood smear:   Microcytic hypochromic anemia with polychromasia, suggestive of iron deficiency. Unremarkable platelets and white blood cells  Retic 1.3 (0.4-1.9%)  Fe 10 TIBC 461 FeSat 2%  Transferrin 391 (200-360)  Ferritin 3  Given 1 unit prbc 10/26/2021  On 10/28/2021: Hb 7.8  Hct 29.1  MCV 58.3    WBC 4.0  Hb 8.5 on 11/03/2021. She has been on FeSO4 325 mg po TID with improvement in her energy level. On 11/11/2021: Hb 9.9  Hct 35.3  MCV 68.1  WBC 5.4    Peripheral blood smear:  Microcytic anemia with absolute red cell count within normal range. Dimorphic population of red cells noted, which may indicate recent red blood cell transfusion. Marked anisocytosis is present, with moderate poikilocytosis and hypochromia. Rare schistocytes, target cells, and teardrop cells are all present. Negative for rouleaux formation. White blood cells and platelets (counts and morphology) are within normal limits. BUN 11 Creat 0.8  LFTs wnl  Fe 176 TIBC 357 FeSat 49% Ferritin 82   Epo 13 (4-27)  Hemolytic work-up Negative  Zinc, VitB6, Copper WNL  SPEP: Normal. No monoclonal protein identified  SIFE: Normal. No monoclonal protein identified.      Sickle cell screen: Positive  Hemoglobin electrophoresis: Alpha (+) thalassemia (silent carrier) state (-a/aa)  Genetic counseling    Peripheral blood flow cytometry noted no immunophenotypic evidence of acute leukemia or a T-cell or B-cell neoplasm  Peripheral blood MDS FISH: Negative Study  JAK2 Mutation Not detected    Microcytic anemia combination of iron deficiency anemia and alpha thalassemia  Changed FeSO4 325 mg to bid  On 01/27/2022:  Hb 12.4  Hct 39.9  MCV 82.6  WBC 6.9    Fe 149 TIBC 280 FeSat 53% Ferritin 34  Changed FeSO4 325 mg to po once daily    On 03/15/2022:   Hb 14.1  Hct 43.3  MCV 80.8   WBC 6.6     Fe 246 TIBC 367  FeSat 67% Ferritin 41  D/C FeSO4 325 mg once daily    On 04/27/2022:  Hb 12.9   Hct 40.9  MCV 83.6   WBC 7.5     Fe 125 TIBC 281  FeSat 44%  Ferritin 37  Continued to be off FeSO4    On 06/21/2022:  Hb 13.5   Hct 41.7  MCV 82.6   WBC 7.1     Fe 129 TIBC 371  FeSat 35%  Ferritin 15  Continued to be off FeSO4    On 08/26/2022:  Hb 14.0   Hct 41.7  MCV 81.4   WBC 8.2     Fe 160 TIBC 432  FeSat 37%  Ferritin 14  Continued to be off FeSO4    On 10/28/2022:  Hb 13.6  Hct 41.9   MCV 83.3    WBC 8.4     Fe 60  TIBC 436  FeSat 14%   Ferritin 7  Recommended to go back FeSO4 325 mg po once daily. Today 11/28/2022. No fever, chills. Fair appetite and energy level. No bleeding noted. She took FeSO4 twice day for few days and the rest she was taking it once daily. Review of Systems;  CONSTITUTIONAL: No fever, chills. Fair appetite and energy level  ENMT: Eyes: No diplopia; Nose: No epistaxis. Mouth: No sore throat. RESPIRATORY: No hemoptysis, shortness of breath, cough. CARDIOVASCULAR: No chest pain, palpitations. GASTROINTESTINAL: No N/V abdominal pain  GENITOURINARY: No dysuria, urinary frequency, hematuria. NEURO: No syncope, presyncope, headache. Remainder:ROS NEGATIVE    Past Medical History:      Diagnosis Date    Anemia     Constipation     History of blood transfusion      Medications:  Reviewed and reconciled. Allergies:   Allergies   Allergen Reactions    Other Swelling     Oragel-localized swelling     Physical Exam:  /77   Pulse (!) 132   Temp 97.3 °F (36.3 °C) (Infrared)   Resp 16   Ht 5' 2\" (1.575 m)   Wt 121 lb (54.9 kg)   SpO2 100%   BMI 22.13 kg/m²   GENERAL: Alert, oriented x 3, not in distress. HEENT: PERRLA; EOMI. Oropharynx clear. LUNGS: CTA Deepak  CVS: RRR  EXTREMITIES: Without clubbing, cyanosis, or edema. NEUROLOGIC: No focal deficits. Lab Results   Component Value Date    WBC 8.1 11/21/2022    HGB 13.8 11/21/2022    HCT 42.3 11/21/2022    MCV 83.1 11/21/2022     11/21/2022     Lab Results   Component Value Date     11/21/2022    K 3.9 11/21/2022     11/21/2022    CO2 27 11/21/2022    BUN 15 11/21/2022    CREATININE 0.7 11/21/2022    GLUCOSE 108 (H) 11/21/2022    CALCIUM 9.7 11/21/2022    PROT 7.4 11/21/2022    LABALBU 4.1 11/21/2022    BILITOT <0.2 11/21/2022    ALKPHOS 56 11/21/2022    AST 16 11/21/2022    ALT 14 11/21/2022    LABGLOM >60 11/21/2022    GFRAA >60 08/26/2022     Lab Results   Component Value Date    IRON 183 (H) 11/21/2022    TIBC 440 11/21/2022    FERRITIN 14 11/21/2022     Impression/Plan:  23 y/o female with hx of constipation who was noted to have microcytic anemia on routine blood work. On 10/26/2021: Hb 5.9  Hct 17.7  MCV 53.1    WBC 4.8  Peripheral blood smear:   Microcytic hypochromic anemia with polychromasia, suggestive of iron deficiency. Unremarkable platelets and white blood cells  Retic 1.3 (0.4-1.9%)  Fe 10 TIBC 461 FeSat 2%  Transferrin 391 (200-360)  Ferritin 3  Given 1 unit prbc 10/26/2021  On 10/28/2021: Hb 7.8  Hct 29.1  MCV 58.3    WBC 4.0  Hb 8.5 on 11/03/2021. She has been on FeSO4 325 mg po TID with improvement in her energy level. On 11/11/2021: Hb 9.9  Hct 35.3  MCV 68.1  WBC 5.4    Peripheral blood smear:  Microcytic anemia with absolute red cell count within normal range. Dimorphic population of red cells noted, which may indicate recent red blood cell transfusion. Marked anisocytosis is present, with moderate poikilocytosis and hypochromia. Rare schistocytes, target cells, and teardrop cells are all present. Negative for rouleaux formation.    White blood cells and platelets (counts and morphology) are within normal limits. BUN 11 Creat 0.8  LFTs wnl  Fe 176 TIBC 357 FeSat 49% Ferritin 82   Epo 13 (4-27)  Hemolytic work-up Negative  Zinc, VitB6, Copper WNL  SPEP: Normal. No monoclonal protein identified  SIFE: Normal. No monoclonal protein identified. Sickle cell screen: Positive  Hemoglobin electrophoresis: Alpha (+) thalassemia (silent carrier) state (-a/aa)  Genetic counseling recommended and ordered    Peripheral blood flow cytometry noted no immunophenotypic evidence of acute leukemia or a T-cell or B-cell neoplasm  Peripheral blood MDS FISH: Negative Study  JAK2 Mutation Not detected    Microcytic anemia combination of iron deficiency anemia and alpha thalassemia  Changed FeSO4 325 mg to bid    On 01/27/2022:  Hb 12.4  Hct 39.9  MCV 82.6  WBC 6.9    Fe 149 TIBC 280 FeSat 53% Ferritin 34  Changed FeSO4 325 mg to po once daily    On 03/15/2022:   Hb 14.1  Hct 43.3  MCV 80.8   WBC 6.6     Fe 246 TIBC 367  FeSat 67% Ferritin 41  D/C FeSO4 325 mg once daily    On 04/27/2022:  Hb 12.9   Hct 40.9  MCV 83.6   WBC 7.5     Fe 125 TIBC 281  FeSat 44%  Ferritin 37  Continued to be off FeSO4    On 06/21/2022:  Hb 13.5   Hct 41.7  MCV 82.6   WBC 7.1     Fe 129 TIBC 371  FeSat 35%  Ferritin 15  Continued to be off FeSO4    On 08/26/2022:  Hb 14.0   Hct 41.7  MCV 81.4   WBC 8.2     Fe 160 TIBC 432  FeSat 37%  Ferritin 14  Continued to be off FeSO4    On 10/28/2022:  Hb 13.6  Hct 41.9   MCV 83.3    WBC 8.4     Fe 60  TIBC 436  FeSat 14%   Ferritin 7  Recommended to go back FeSO4 325 mg po once daily. On 11/21/2022:  Hb 13.8  Hct 42.3   MCV 83.1    WBC 8.1      Fe 183  TIBC 440  FeSat 42%  Ferritin 14  Labs reviewed; recommended to take FeSO4 every other day instead of every day.     RTC 2 months with prior labs     Lino Daniel MD   11/28/2022

## 2023-01-30 ENCOUNTER — HOSPITAL ENCOUNTER (OUTPATIENT)
Dept: INFUSION THERAPY | Age: 23
Discharge: HOME | End: 2023-01-30
Payer: COMMERCIAL

## 2023-01-30 DIAGNOSIS — D50.9 IRON DEFICIENCY ANEMIA, UNSPECIFIED IRON DEFICIENCY ANEMIA TYPE: ICD-10-CM

## 2023-01-30 LAB
ALBUMIN SERPL-MCNC: 4 G/DL (ref 3.5–5.2)
ALP BLD-CCNC: 67 U/L (ref 35–104)
ALT SERPL-CCNC: 13 U/L (ref 0–32)
ANION GAP SERPL CALCULATED.3IONS-SCNC: 12 MMOL/L (ref 7–16)
AST SERPL-CCNC: 17 U/L (ref 0–31)
BASOPHILS ABSOLUTE: 0.05 E9/L (ref 0–0.2)
BASOPHILS RELATIVE PERCENT: 0.7 % (ref 0–2)
BILIRUB SERPL-MCNC: 0.4 MG/DL (ref 0–1.2)
BUN BLDV-MCNC: 12 MG/DL (ref 6–20)
CALCIUM SERPL-MCNC: 9.2 MG/DL (ref 8.6–10.2)
CHLORIDE BLD-SCNC: 105 MMOL/L (ref 98–107)
CO2: 23 MMOL/L (ref 22–29)
CREAT SERPL-MCNC: 0.8 MG/DL (ref 0.5–1)
EOSINOPHILS ABSOLUTE: 0.07 E9/L (ref 0.05–0.5)
EOSINOPHILS RELATIVE PERCENT: 1 % (ref 0–6)
FERRITIN: 40 NG/ML
GFR SERPL CREATININE-BSD FRML MDRD: >60 ML/MIN/1.73
GLUCOSE BLD-MCNC: 104 MG/DL (ref 74–99)
HCT VFR BLD CALC: 37.4 % (ref 34–48)
HEMOGLOBIN: 12.5 G/DL (ref 11.5–15.5)
IMMATURE GRANULOCYTES #: 0.07 E9/L
IMMATURE GRANULOCYTES %: 1 % (ref 0–5)
IRON SATURATION: 38 % (ref 15–50)
IRON: 119 MCG/DL (ref 37–145)
LYMPHOCYTES ABSOLUTE: 1.62 E9/L (ref 1.5–4)
LYMPHOCYTES RELATIVE PERCENT: 23.1 % (ref 20–42)
MCH RBC QN AUTO: 26.6 PG (ref 26–35)
MCHC RBC AUTO-ENTMCNC: 33.4 % (ref 32–34.5)
MCV RBC AUTO: 79.6 FL (ref 80–99.9)
MONOCYTES ABSOLUTE: 0.45 E9/L (ref 0.1–0.95)
MONOCYTES RELATIVE PERCENT: 6.4 % (ref 2–12)
NEUTROPHILS ABSOLUTE: 4.74 E9/L (ref 1.8–7.3)
NEUTROPHILS RELATIVE PERCENT: 67.8 % (ref 43–80)
PDW BLD-RTO: 13.2 FL (ref 11.5–15)
PLATELET # BLD: 190 E9/L (ref 130–450)
PMV BLD AUTO: 10.7 FL (ref 7–12)
POTASSIUM SERPL-SCNC: 4.1 MMOL/L (ref 3.5–5)
RBC # BLD: 4.7 E12/L (ref 3.5–5.5)
SODIUM BLD-SCNC: 140 MMOL/L (ref 132–146)
TOTAL IRON BINDING CAPACITY: 311 MCG/DL (ref 250–450)
TOTAL PROTEIN: 7.3 G/DL (ref 6.4–8.3)
WBC # BLD: 7 E9/L (ref 4.5–11.5)

## 2023-01-30 PROCEDURE — 36415 COLL VENOUS BLD VENIPUNCTURE: CPT

## 2023-01-30 PROCEDURE — 82728 ASSAY OF FERRITIN: CPT

## 2023-01-30 PROCEDURE — 83550 IRON BINDING TEST: CPT

## 2023-01-30 PROCEDURE — 85025 COMPLETE CBC W/AUTO DIFF WBC: CPT

## 2023-01-30 PROCEDURE — 80053 COMPREHEN METABOLIC PANEL: CPT

## 2023-01-30 PROCEDURE — 83540 ASSAY OF IRON: CPT

## 2023-02-01 ENCOUNTER — OFFICE VISIT (OUTPATIENT)
Dept: ONCOLOGY | Age: 23
End: 2023-02-01
Payer: COMMERCIAL

## 2023-02-01 ENCOUNTER — HOSPITAL ENCOUNTER (OUTPATIENT)
Dept: INFUSION THERAPY | Age: 23
Discharge: HOME | End: 2023-02-01

## 2023-02-01 VITALS
DIASTOLIC BLOOD PRESSURE: 72 MMHG | OXYGEN SATURATION: 99 % | TEMPERATURE: 97.8 F | HEIGHT: 62 IN | HEART RATE: 98 BPM | BODY MASS INDEX: 21.71 KG/M2 | SYSTOLIC BLOOD PRESSURE: 118 MMHG | WEIGHT: 118 LBS | RESPIRATION RATE: 16 BRPM

## 2023-02-01 DIAGNOSIS — D50.9 IRON DEFICIENCY ANEMIA, UNSPECIFIED IRON DEFICIENCY ANEMIA TYPE: Primary | ICD-10-CM

## 2023-02-01 PROCEDURE — 99212 OFFICE O/P EST SF 10 MIN: CPT

## 2023-02-01 PROCEDURE — 99213 OFFICE O/P EST LOW 20 MIN: CPT | Performed by: INTERNAL MEDICINE

## 2023-02-01 RX ORDER — PROPRANOLOL HYDROCHLORIDE 120 MG/1
120 CAPSULE, EXTENDED RELEASE ORAL DAILY
COMMUNITY

## 2023-02-01 RX ORDER — FERROUS SULFATE 325(65) MG
325 TABLET ORAL EVERY OTHER DAY
Qty: 30 TABLET | Refills: 0 | Status: SHIPPED | OUTPATIENT
Start: 2023-02-01

## 2023-02-01 NOTE — PROGRESS NOTES
Department of [facility] Oncology   Attending Clinic Note    Reason for Visit: Follow-up on a patient with Iron deficiency anemia and alpha Thalassemia    PCP:  Tramaine Obregon MD    History of Present Illness:  26 y/o female with hx of constipation who was noted to have microcytic anemia on routine blood work. On 10/26/2021: Hb 5.9  Hct 17.7  MCV 53.1    WBC 4.8  Peripheral blood smear:   Microcytic hypochromic anemia with polychromasia, suggestive of iron deficiency. Unremarkable platelets and white blood cells  Retic 1.3 (0.4-1.9%)  Fe 10 TIBC 461 FeSat 2%  Transferrin 391 (200-360)  Ferritin 3  Given 1 unit prbc 10/26/2021  On 10/28/2021: Hb 7.8  Hct 29.1  MCV 58.3    WBC 4.0  Hb 8.5 on 11/03/2021. She has been on FeSO4 325 mg po TID with improvement in her energy level. On 11/11/2021: Hb 9.9  Hct 35.3  MCV 68.1  WBC 5.4    Peripheral blood smear:  Microcytic anemia with absolute red cell count within normal range. Dimorphic population of red cells noted, which may indicate recent red blood cell transfusion. Marked anisocytosis is present, with moderate poikilocytosis and hypochromia. Rare schistocytes, target cells, and teardrop cells are all present. Negative for rouleaux formation. White blood cells and platelets (counts and morphology) are within normal limits. BUN 11 Creat 0.8  LFTs wnl  Fe 176 TIBC 357 FeSat 49% Ferritin 82   Epo 13 (4-27)  Hemolytic work-up Negative  Zinc, VitB6, Copper WNL  SPEP: Normal. No monoclonal protein identified  SIFE: Normal. No monoclonal protein identified.      Sickle cell screen: Positive  Hemoglobin electrophoresis: Alpha (+) thalassemia (silent carrier) state (-a/aa)  Genetic counseling    Peripheral blood flow cytometry noted no immunophenotypic evidence of acute leukemia or a T-cell or B-cell neoplasm  Peripheral blood MDS FISH: Negative Study  JAK2 Mutation Not detected    Microcytic anemia combination of iron deficiency anemia and alpha thalassemia  Changed FeSO4 325 mg to bid  On 01/27/2022:  Hb 12.4  Hct 39.9  MCV 82.6  WBC 6.9    Fe 149 TIBC 280 FeSat 53% Ferritin 34  Changed FeSO4 325 mg to po once daily    On 03/15/2022:   Hb 14.1  Hct 43.3  MCV 80.8   WBC 6.6     Fe 246 TIBC 367  FeSat 67% Ferritin 41  D/C FeSO4 325 mg once daily    On 04/27/2022:  Hb 12.9   Hct 40.9  MCV 83.6   WBC 7.5     Fe 125 TIBC 281  FeSat 44%  Ferritin 37  Continued to be off FeSO4    On 06/21/2022:  Hb 13.5   Hct 41.7  MCV 82.6   WBC 7.1     Fe 129 TIBC 371  FeSat 35%  Ferritin 15  Continued to be off FeSO4    On 08/26/2022:  Hb 14.0   Hct 41.7  MCV 81.4   WBC 8.2     Fe 160 TIBC 432  FeSat 37%  Ferritin 14  Continued to be off FeSO4    On 10/28/2022:  Hb 13.6  Hct 41.9   MCV 83.3    WBC 8.4     Fe 60  TIBC 436  FeSat 14%   Ferritin 7  Recommended to go back FeSO4 325 mg po once daily. On 11/21/2022:  Hb 13.8  Hct 42.3   MCV 83.1    WBC 8.1      Fe 183  TIBC 440  FeSat 42%  Ferritin 14  Recommended to take FeSO4 every other day instead of every day. Today 02/01/2023; No fever, chills. Fair appetite and energy level. No bleeding noted. Review of Systems;  CONSTITUTIONAL: No fever, chills. Fair appetite and energy level  ENMT: Eyes: No diplopia; Nose: No epistaxis. Mouth: No sore throat. RESPIRATORY: No hemoptysis, shortness of breath, cough. CARDIOVASCULAR: No chest pain, palpitations. GASTROINTESTINAL: No N/V abdominal pain  GENITOURINARY: No dysuria, urinary frequency, hematuria. NEURO: No syncope, presyncope, headache. Remainder:ROS NEGATIVE    Past Medical History:      Diagnosis Date    Anemia     Constipation     History of blood transfusion      Medications:  Reviewed and reconciled. Allergies:   Allergies   Allergen Reactions    Other Swelling     Oragel-localized swelling     Physical Exam:  /72 (Site: Right Upper Arm, Position: Sitting, Cuff Size: Large Adult)   Pulse 98   Temp 97.8 °F (36.6 °C) (Infrared)   Resp 16   Ht 5' 2\" (1.575 m)   Wt 118 lb (53.5 kg)   SpO2 99%   BMI 21.58 kg/m²   GENERAL: Alert, oriented x 3, not in distress. HEENT: PERRLA; EOMI. Oropharynx clear. EXTREMITIES: Without clubbing, cyanosis, or edema. NEUROLOGIC: No focal deficits. Lab Results   Component Value Date    WBC 7.0 01/30/2023    HGB 12.5 01/30/2023    HCT 37.4 01/30/2023    MCV 79.6 (L) 01/30/2023     01/30/2023     Lab Results   Component Value Date     01/30/2023    K 4.1 01/30/2023     01/30/2023    CO2 23 01/30/2023    BUN 12 01/30/2023    CREATININE 0.8 01/30/2023    GLUCOSE 104 (H) 01/30/2023    CALCIUM 9.2 01/30/2023    PROT 7.3 01/30/2023    LABALBU 4.0 01/30/2023    BILITOT 0.4 01/30/2023    ALKPHOS 67 01/30/2023    AST 17 01/30/2023    ALT 13 01/30/2023    LABGLOM >60 01/30/2023    GFRAA >60 08/26/2022     Lab Results   Component Value Date    IRON 119 01/30/2023    TIBC 311 01/30/2023    FERRITIN 40 01/30/2023     Impression/Plan:  26 y/o female with hx of constipation who was noted to have microcytic anemia on routine blood work. On 10/26/2021: Hb 5.9  Hct 17.7  MCV 53.1    WBC 4.8  Peripheral blood smear:   Microcytic hypochromic anemia with polychromasia, suggestive of iron deficiency. Unremarkable platelets and white blood cells  Retic 1.3 (0.4-1.9%)  Fe 10 TIBC 461 FeSat 2%  Transferrin 391 (200-360)  Ferritin 3  Given 1 unit prbc 10/26/2021  On 10/28/2021: Hb 7.8  Hct 29.1  MCV 58.3    WBC 4.0  Hb 8.5 on 11/03/2021. She has been on FeSO4 325 mg po TID with improvement in her energy level. On 11/11/2021: Hb 9.9  Hct 35.3  MCV 68.1  WBC 5.4    Peripheral blood smear:  Microcytic anemia with absolute red cell count within normal range. Dimorphic population of red cells noted, which may indicate recent red blood cell transfusion. Marked anisocytosis is present, with moderate poikilocytosis and hypochromia.     Rare schistocytes, target cells, and teardrop cells are all present. Negative for rouleaux formation. White blood cells and platelets (counts and morphology) are within normal limits. BUN 11 Creat 0.8  LFTs wnl  Fe 176 TIBC 357 FeSat 49% Ferritin 82   Epo 13 (4-27)  Hemolytic work-up Negative  Zinc, VitB6, Copper WNL  SPEP: Normal. No monoclonal protein identified  SIFE: Normal. No monoclonal protein identified. Sickle cell screen: Positive  Hemoglobin electrophoresis: Alpha (+) thalassemia (silent carrier) state (-a/aa)  Genetic counseling recommended and ordered    Peripheral blood flow cytometry noted no immunophenotypic evidence of acute leukemia or a T-cell or B-cell neoplasm  Peripheral blood MDS FISH: Negative Study  JAK2 Mutation Not detected    Microcytic anemia combination of iron deficiency anemia and alpha thalassemia  Changed FeSO4 325 mg to bid    On 01/27/2022:  Hb 12.4  Hct 39.9  MCV 82.6  WBC 6.9    Fe 149 TIBC 280 FeSat 53% Ferritin 34  Changed FeSO4 325 mg to po once daily    On 03/15/2022:   Hb 14.1  Hct 43.3  MCV 80.8   WBC 6.6     Fe 246 TIBC 367  FeSat 67% Ferritin 41  D/C FeSO4 325 mg once daily    On 04/27/2022:  Hb 12.9   Hct 40.9  MCV 83.6   WBC 7.5     Fe 125 TIBC 281  FeSat 44%  Ferritin 37  Continued to be off FeSO4    On 06/21/2022:  Hb 13.5   Hct 41.7  MCV 82.6   WBC 7.1     Fe 129 TIBC 371  FeSat 35%  Ferritin 15  Continued to be off FeSO4    On 08/26/2022:  Hb 14.0   Hct 41.7  MCV 81.4   WBC 8.2     Fe 160 TIBC 432  FeSat 37%  Ferritin 14  Continued to be off FeSO4    On 10/28/2022:  Hb 13.6  Hct 41.9   MCV 83.3    WBC 8.4     Fe 60  TIBC 436  FeSat 14%   Ferritin 7  Recommended to go back FeSO4 325 mg po once daily. On 11/21/2022:  Hb 13.8  Hct 42.3   MCV 83.1    WBC 8.1      Fe 183  TIBC 440  FeSat 42%  Ferritin 14  Recommended to take FeSO4 every other day instead of every day.     On 01/30/2023:   Hb 12.5  Hct 37.4   MCV 79. 6   WBC 7.0       Fe 119 TIBC 311  FeSat 38%    Ferritin 40  Labs reviewed. Recommended to continue FeSO4 every other day. Fe Script refilled.     RTC 3 months with prior labs     Loni Causey MD   2/1/2023

## 2023-03-29 RX ORDER — FERROUS SULFATE 325(65) MG
325 TABLET ORAL EVERY OTHER DAY
Qty: 30 TABLET | Refills: 0 | Status: SHIPPED | OUTPATIENT
Start: 2023-03-29

## 2023-05-25 RX ORDER — FERROUS SULFATE 325(65) MG
325 TABLET ORAL EVERY OTHER DAY
Qty: 30 TABLET | Refills: 0 | Status: SHIPPED | OUTPATIENT
Start: 2023-05-25

## 2023-06-02 ENCOUNTER — HOSPITAL ENCOUNTER (OUTPATIENT)
Dept: INFUSION THERAPY | Age: 23
Discharge: HOME | End: 2023-06-02
Payer: COMMERCIAL

## 2023-06-02 DIAGNOSIS — D50.9 IRON DEFICIENCY ANEMIA, UNSPECIFIED IRON DEFICIENCY ANEMIA TYPE: ICD-10-CM

## 2023-06-02 LAB
ALBUMIN SERPL-MCNC: 4.2 G/DL (ref 3.5–5.2)
ALP SERPL-CCNC: 67 U/L (ref 35–104)
ALT SERPL-CCNC: 14 U/L (ref 0–32)
ANION GAP SERPL CALCULATED.3IONS-SCNC: 10 MMOL/L (ref 7–16)
AST SERPL-CCNC: 18 U/L (ref 0–31)
BASOPHILS # BLD: 0.05 E9/L (ref 0–0.2)
BASOPHILS NFR BLD: 0.7 % (ref 0–2)
BILIRUB SERPL-MCNC: 0.3 MG/DL (ref 0–1.2)
BUN SERPL-MCNC: 14 MG/DL (ref 6–20)
CALCIUM SERPL-MCNC: 9.5 MG/DL (ref 8.6–10.2)
CHLORIDE SERPL-SCNC: 105 MMOL/L (ref 98–107)
CO2 SERPL-SCNC: 23 MMOL/L (ref 22–29)
CREAT SERPL-MCNC: 0.8 MG/DL (ref 0.5–1)
EOSINOPHIL # BLD: 0.1 E9/L (ref 0.05–0.5)
EOSINOPHIL NFR BLD: 1.4 % (ref 0–6)
ERYTHROCYTE [DISTWIDTH] IN BLOOD BY AUTOMATED COUNT: 12.9 FL (ref 11.5–15)
FERRITIN SERPL-MCNC: 33 NG/ML
GLUCOSE SERPL-MCNC: 91 MG/DL (ref 74–99)
HCT VFR BLD AUTO: 43 % (ref 34–48)
HGB BLD-MCNC: 13.5 G/DL (ref 11.5–15.5)
IMM GRANULOCYTES # BLD: 0.02 E9/L
IMM GRANULOCYTES NFR BLD: 0.3 % (ref 0–5)
IRON SATN MFR SERPL: 25 % (ref 15–50)
IRON SERPL-MCNC: 75 MCG/DL (ref 37–145)
LYMPHOCYTES # BLD: 2.14 E9/L (ref 1.5–4)
LYMPHOCYTES NFR BLD: 29 % (ref 20–42)
MCH RBC QN AUTO: 26.3 PG (ref 26–35)
MCHC RBC AUTO-ENTMCNC: 31.4 % (ref 32–34.5)
MCV RBC AUTO: 83.8 FL (ref 80–99.9)
MONOCYTES # BLD: 0.72 E9/L (ref 0.1–0.95)
MONOCYTES NFR BLD: 9.8 % (ref 2–12)
NEUTROPHILS # BLD: 4.34 E9/L (ref 1.8–7.3)
NEUTS SEG NFR BLD: 58.8 % (ref 43–80)
PLATELET # BLD AUTO: 205 E9/L (ref 130–450)
PMV BLD AUTO: 9.9 FL (ref 7–12)
POTASSIUM SERPL-SCNC: 4.3 MMOL/L (ref 3.5–5)
PROT SERPL-MCNC: 7.7 G/DL (ref 6.4–8.3)
RBC # BLD AUTO: 5.13 E12/L (ref 3.5–5.5)
SODIUM SERPL-SCNC: 138 MMOL/L (ref 132–146)
TIBC SERPL-MCNC: 306 MCG/DL (ref 250–450)
WBC # BLD: 7.4 E9/L (ref 4.5–11.5)

## 2023-06-02 PROCEDURE — 82728 ASSAY OF FERRITIN: CPT

## 2023-06-02 PROCEDURE — 85025 COMPLETE CBC W/AUTO DIFF WBC: CPT

## 2023-06-02 PROCEDURE — 83550 IRON BINDING TEST: CPT

## 2023-06-02 PROCEDURE — 83540 ASSAY OF IRON: CPT

## 2023-06-02 PROCEDURE — 80053 COMPREHEN METABOLIC PANEL: CPT

## 2023-06-02 PROCEDURE — 36415 COLL VENOUS BLD VENIPUNCTURE: CPT

## 2023-06-05 ENCOUNTER — OFFICE VISIT (OUTPATIENT)
Dept: ONCOLOGY | Age: 23
End: 2023-06-05
Payer: COMMERCIAL

## 2023-06-05 ENCOUNTER — HOSPITAL ENCOUNTER (OUTPATIENT)
Dept: INFUSION THERAPY | Age: 23
Discharge: HOME | End: 2023-06-05

## 2023-06-05 VITALS
HEIGHT: 62 IN | TEMPERATURE: 97.8 F | SYSTOLIC BLOOD PRESSURE: 118 MMHG | BODY MASS INDEX: 21.7 KG/M2 | HEART RATE: 93 BPM | WEIGHT: 117.9 LBS | OXYGEN SATURATION: 99 % | DIASTOLIC BLOOD PRESSURE: 75 MMHG

## 2023-06-05 DIAGNOSIS — D56.0 ALPHA THALASSEMIA (HCC): ICD-10-CM

## 2023-06-05 DIAGNOSIS — D50.9 IRON DEFICIENCY ANEMIA, UNSPECIFIED IRON DEFICIENCY ANEMIA TYPE: Primary | ICD-10-CM

## 2023-06-05 PROCEDURE — 99213 OFFICE O/P EST LOW 20 MIN: CPT | Performed by: INTERNAL MEDICINE

## 2023-06-05 PROCEDURE — 99212 OFFICE O/P EST SF 10 MIN: CPT

## 2023-06-05 NOTE — PROGRESS NOTES
Patient did stop at check out window, ok'd to leave without AVS.  Patient has [address]
every other day instead of every day. On 01/30/2023:   Hb 12.5  Hct 37.4   MCV 79.6   WBC 7.0       Fe 119 TIBC 311  FeSat 38%    Ferritin 40  Recommended to continue FeSO4 every other day. On 06/02/2023:   Hb 13.5  Hct 43.0  MCV 83.8   WBC 7.4       Fe 75 TIBC 306  FeSat 25% Ferritin 33  Labs reviewed.  Continue FeSO4 every other day  RTC 3 months with labs     Nettie Schirmer, MD   6/5/2023

## 2023-07-19 RX ORDER — FERROUS SULFATE 325(65) MG
325 TABLET ORAL EVERY OTHER DAY
Qty: 30 TABLET | Refills: 0 | Status: SHIPPED | OUTPATIENT
Start: 2023-07-19

## 2023-09-18 DIAGNOSIS — D50.9 IRON DEFICIENCY ANEMIA, UNSPECIFIED IRON DEFICIENCY ANEMIA TYPE: Primary | ICD-10-CM

## 2023-10-03 ENCOUNTER — HOSPITAL ENCOUNTER (OUTPATIENT)
Dept: INFUSION THERAPY | Age: 23
Discharge: HOME | End: 2023-10-03
Payer: COMMERCIAL

## 2023-10-03 DIAGNOSIS — D50.9 IRON DEFICIENCY ANEMIA, UNSPECIFIED IRON DEFICIENCY ANEMIA TYPE: ICD-10-CM

## 2023-10-03 DIAGNOSIS — D50.9 IRON DEFICIENCY ANEMIA, UNSPECIFIED IRON DEFICIENCY ANEMIA TYPE: Primary | ICD-10-CM

## 2023-10-03 LAB
ALBUMIN SERPL-MCNC: 4.5 G/DL (ref 3.5–5.2)
ALP SERPL-CCNC: 80 U/L (ref 35–104)
ALT SERPL-CCNC: 16 U/L (ref 0–32)
ANION GAP SERPL CALCULATED.3IONS-SCNC: 12 MMOL/L (ref 7–16)
AST SERPL-CCNC: 18 U/L (ref 0–31)
BASOPHILS # BLD: 0.03 K/UL (ref 0–0.2)
BASOPHILS NFR BLD: 1 % (ref 0–2)
BILIRUB SERPL-MCNC: 0.5 MG/DL (ref 0–1.2)
BUN SERPL-MCNC: 14 MG/DL (ref 6–20)
CALCIUM SERPL-MCNC: 9.6 MG/DL (ref 8.6–10.2)
CHLORIDE SERPL-SCNC: 100 MMOL/L (ref 98–107)
CO2 SERPL-SCNC: 22 MMOL/L (ref 22–29)
CREAT SERPL-MCNC: 0.8 MG/DL (ref 0.5–1)
EOSINOPHIL # BLD: 0.1 K/UL (ref 0.05–0.5)
EOSINOPHILS RELATIVE PERCENT: 2 % (ref 0–6)
ERYTHROCYTE [DISTWIDTH] IN BLOOD BY AUTOMATED COUNT: 13 % (ref 11.5–15)
FERRITIN SERPL-MCNC: 42 NG/ML
GFR SERPL CREATININE-BSD FRML MDRD: >60 ML/MIN/1.73M2
GLUCOSE SERPL-MCNC: 107 MG/DL (ref 74–99)
HCT VFR BLD AUTO: 41.4 % (ref 34–48)
HGB BLD-MCNC: 13.2 G/DL (ref 11.5–15.5)
IMM GRANULOCYTES # BLD AUTO: <0.03 K/UL (ref 0–0.58)
IMM GRANULOCYTES NFR BLD: 0 % (ref 0–5)
IRON SATN MFR SERPL: 49 % (ref 15–50)
IRON SERPL-MCNC: 147 UG/DL (ref 37–145)
LYMPHOCYTES NFR BLD: 1.75 K/UL (ref 1.5–4)
LYMPHOCYTES RELATIVE PERCENT: 27 % (ref 20–42)
MCH RBC QN AUTO: 26.2 PG (ref 26–35)
MCHC RBC AUTO-ENTMCNC: 31.9 G/DL (ref 32–34.5)
MCV RBC AUTO: 82.1 FL (ref 80–99.9)
MONOCYTES NFR BLD: 0.59 K/UL (ref 0.1–0.95)
MONOCYTES NFR BLD: 9 % (ref 2–12)
NEUTROPHILS NFR BLD: 61 % (ref 43–80)
NEUTS SEG NFR BLD: 3.94 K/UL (ref 1.8–7.3)
PLATELET # BLD AUTO: 196 K/UL (ref 130–450)
PMV BLD AUTO: 10.3 FL (ref 7–12)
POTASSIUM SERPL-SCNC: 4.2 MMOL/L (ref 3.5–5)
PROT SERPL-MCNC: 8.1 G/DL (ref 6.4–8.3)
RBC # BLD AUTO: 5.04 M/UL (ref 3.5–5.5)
SODIUM SERPL-SCNC: 134 MMOL/L (ref 132–146)
TIBC SERPL-MCNC: 302 UG/DL (ref 250–450)
WBC OTHER # BLD: 6.4 K/UL (ref 4.5–11.5)

## 2023-10-03 PROCEDURE — 82728 ASSAY OF FERRITIN: CPT

## 2023-10-03 PROCEDURE — 36415 COLL VENOUS BLD VENIPUNCTURE: CPT

## 2023-10-03 PROCEDURE — 80053 COMPREHEN METABOLIC PANEL: CPT

## 2023-10-03 PROCEDURE — 85025 COMPLETE CBC W/AUTO DIFF WBC: CPT

## 2023-10-03 PROCEDURE — 83540 ASSAY OF IRON: CPT

## 2023-10-03 PROCEDURE — 83550 IRON BINDING TEST: CPT

## 2023-10-04 ENCOUNTER — HOSPITAL ENCOUNTER (OUTPATIENT)
Dept: INFUSION THERAPY | Age: 23
Discharge: HOME | End: 2023-10-04

## 2023-10-04 ENCOUNTER — OFFICE VISIT (OUTPATIENT)
Dept: ONCOLOGY | Age: 23
End: 2023-10-04
Payer: COMMERCIAL

## 2023-10-04 VITALS
DIASTOLIC BLOOD PRESSURE: 72 MMHG | WEIGHT: 115.2 LBS | HEIGHT: 62 IN | HEART RATE: 93 BPM | TEMPERATURE: 98.1 F | BODY MASS INDEX: 21.2 KG/M2 | SYSTOLIC BLOOD PRESSURE: 108 MMHG | OXYGEN SATURATION: 99 %

## 2023-10-04 DIAGNOSIS — D50.9 IRON DEFICIENCY ANEMIA, UNSPECIFIED IRON DEFICIENCY ANEMIA TYPE: Primary | ICD-10-CM

## 2023-10-04 DIAGNOSIS — D56.0 ALPHA THALASSEMIA (HCC): ICD-10-CM

## 2023-10-04 PROCEDURE — 99212 OFFICE O/P EST SF 10 MIN: CPT

## 2023-10-04 PROCEDURE — 99213 OFFICE O/P EST LOW 20 MIN: CPT | Performed by: CLINICAL NURSE SPECIALIST

## 2023-10-04 NOTE — PROGRESS NOTES
Patient did stop at check out window, ok'd to leave without AVS.  Patient has [address] [facility].
poikilocytosis and hypochromia. Rare schistocytes, target cells, and teardrop cells are all present. Negative for rouleaux formation. White blood cells and platelets (counts and morphology) are within normal limits. BUN 11 Creat 0.8  LFTs wnl  Fe 176 TIBC 357 FeSat 49% Ferritin 82   Epo 13 (4-27)  Hemolytic work-up Negative  Zinc, VitB6, Copper WNL  SPEP: Normal. No monoclonal protein identified  SIFE: Normal. No monoclonal protein identified. Sickle cell screen: Positive  Hemoglobin electrophoresis: Alpha (+) thalassemia (silent carrier) state (-a/aa)  Genetic counseling recommended and ordered    Peripheral blood flow cytometry noted no immunophenotypic evidence of acute leukemia or a T-cell or B-cell neoplasm  Peripheral blood MDS FISH: Negative Study  JAK2 Mutation Not detected    Microcytic anemia combination of iron deficiency anemia and alpha thalassemia  Changed FeSO4 325 mg to bid    On 01/27/2022:  Hb 12.4  Hct 39.9  MCV 82.6  WBC 6.9    Fe 149 TIBC 280 FeSat 53% Ferritin 34  Changed FeSO4 325 mg to po once daily    On 03/15/2022:   Hb 14.1  Hct 43.3  MCV 80.8   WBC 6.6     Fe 246 TIBC 367  FeSat 67% Ferritin 41  D/C FeSO4 325 mg once daily    On 04/27/2022:  Hb 12.9   Hct 40.9  MCV 83.6   WBC 7.5     Fe 125 TIBC 281  FeSat 44%  Ferritin 37  Continued to be off FeSO4    On 06/21/2022:  Hb 13.5   Hct 41.7  MCV 82.6   WBC 7.1     Fe 129 TIBC 371  FeSat 35%  Ferritin 15  Continued to be off FeSO4    On 08/26/2022:  Hb 14.0   Hct 41.7  MCV 81.4   WBC 8.2     Fe 160 TIBC 432  FeSat 37%  Ferritin 14  Continued to be off FeSO4    On 10/28/2022:  Hb 13.6  Hct 41.9   MCV 83.3    WBC 8.4     Fe 60  TIBC 436  FeSat 14%   Ferritin 7  Recommended to go back FeSO4 325 mg po once daily.     On 11/21/2022:  Hb 13.8  Hct 42.3   MCV 83.1    WBC 8.1      Fe 183  TIBC 440  FeSat 42%  Ferritin 14  Recommended to take FeSO4 every other day instead of every

## 2023-11-14 RX ORDER — FERROUS SULFATE 325(65) MG
325 TABLET ORAL EVERY OTHER DAY
Qty: 30 TABLET | Refills: 0 | Status: SHIPPED | OUTPATIENT
Start: 2023-11-14

## 2024-01-17 ENCOUNTER — HOSPITAL ENCOUNTER (OUTPATIENT)
Dept: INFUSION THERAPY | Age: 24
End: 2024-01-17

## 2024-02-14 ENCOUNTER — HOSPITAL ENCOUNTER (OUTPATIENT)
Dept: INFUSION THERAPY | Age: 24
Discharge: HOME | End: 2024-02-14
Payer: COMMERCIAL

## 2024-02-14 ENCOUNTER — OFFICE VISIT (OUTPATIENT)
Dept: ONCOLOGY | Age: 24
End: 2024-02-14
Payer: COMMERCIAL

## 2024-02-14 VITALS
HEART RATE: 85 BPM | SYSTOLIC BLOOD PRESSURE: 120 MMHG | WEIGHT: 110 LBS | DIASTOLIC BLOOD PRESSURE: 62 MMHG | OXYGEN SATURATION: 99 % | TEMPERATURE: 98 F | HEIGHT: 62 IN | BODY MASS INDEX: 20.24 KG/M2

## 2024-02-14 DIAGNOSIS — D50.9 IRON DEFICIENCY ANEMIA, UNSPECIFIED IRON DEFICIENCY ANEMIA TYPE: ICD-10-CM

## 2024-02-14 DIAGNOSIS — D50.9 IRON DEFICIENCY ANEMIA, UNSPECIFIED IRON DEFICIENCY ANEMIA TYPE: Primary | ICD-10-CM

## 2024-02-14 DIAGNOSIS — D56.0 ALPHA THALASSEMIA (HCC): ICD-10-CM

## 2024-02-14 LAB
ALBUMIN SERPL-MCNC: 4.5 G/DL (ref 3.5–5.2)
ALP SERPL-CCNC: 70 U/L (ref 35–104)
ALT SERPL-CCNC: 18 U/L (ref 0–32)
ANION GAP SERPL CALCULATED.3IONS-SCNC: 10 MMOL/L (ref 7–16)
AST SERPL-CCNC: 19 U/L (ref 0–31)
BASOPHILS # BLD: 0.06 K/UL (ref 0–0.2)
BASOPHILS NFR BLD: 1 % (ref 0–2)
BILIRUB SERPL-MCNC: 0.4 MG/DL (ref 0–1.2)
BUN SERPL-MCNC: 15 MG/DL (ref 6–20)
CALCIUM SERPL-MCNC: 9.6 MG/DL (ref 8.6–10.2)
CHLORIDE SERPL-SCNC: 103 MMOL/L (ref 98–107)
CO2 SERPL-SCNC: 26 MMOL/L (ref 22–29)
CREAT SERPL-MCNC: 0.7 MG/DL (ref 0.5–1)
EOSINOPHIL # BLD: 0.12 K/UL (ref 0.05–0.5)
EOSINOPHILS RELATIVE PERCENT: 2 % (ref 0–6)
ERYTHROCYTE [DISTWIDTH] IN BLOOD BY AUTOMATED COUNT: 12.6 % (ref 11.5–15)
FERRITIN SERPL-MCNC: 14 NG/ML
GFR SERPL CREATININE-BSD FRML MDRD: >60 ML/MIN/1.73M2
GLUCOSE SERPL-MCNC: 92 MG/DL (ref 74–99)
HCT VFR BLD AUTO: 39.1 % (ref 34–48)
HGB BLD-MCNC: 12.5 G/DL (ref 11.5–15.5)
IMM GRANULOCYTES # BLD AUTO: <0.03 K/UL (ref 0–0.58)
IMM GRANULOCYTES NFR BLD: 0 % (ref 0–5)
IRON SATN MFR SERPL: 27 % (ref 15–50)
IRON SERPL-MCNC: 91 UG/DL (ref 37–145)
LYMPHOCYTES NFR BLD: 2.26 K/UL (ref 1.5–4)
LYMPHOCYTES RELATIVE PERCENT: 35 % (ref 20–42)
MCH RBC QN AUTO: 26.5 PG (ref 26–35)
MCHC RBC AUTO-ENTMCNC: 32 G/DL (ref 32–34.5)
MCV RBC AUTO: 82.8 FL (ref 80–99.9)
MONOCYTES NFR BLD: 0.51 K/UL (ref 0.1–0.95)
MONOCYTES NFR BLD: 8 % (ref 2–12)
NEUTROPHILS NFR BLD: 54 % (ref 43–80)
NEUTS SEG NFR BLD: 3.48 K/UL (ref 1.8–7.3)
PLATELET # BLD AUTO: 264 K/UL (ref 130–450)
PMV BLD AUTO: 9.7 FL (ref 7–12)
POTASSIUM SERPL-SCNC: 4.2 MMOL/L (ref 3.5–5)
PROT SERPL-MCNC: 7.9 G/DL (ref 6.4–8.3)
RBC # BLD AUTO: 4.72 M/UL (ref 3.5–5.5)
SODIUM SERPL-SCNC: 139 MMOL/L (ref 132–146)
TIBC SERPL-MCNC: 343 UG/DL (ref 250–450)
WBC OTHER # BLD: 6.4 K/UL (ref 4.5–11.5)

## 2024-02-14 PROCEDURE — 83550 IRON BINDING TEST: CPT

## 2024-02-14 PROCEDURE — 99212 OFFICE O/P EST SF 10 MIN: CPT

## 2024-02-14 PROCEDURE — 82728 ASSAY OF FERRITIN: CPT

## 2024-02-14 PROCEDURE — 85025 COMPLETE CBC W/AUTO DIFF WBC: CPT

## 2024-02-14 PROCEDURE — 83540 ASSAY OF IRON: CPT

## 2024-02-14 PROCEDURE — 99213 OFFICE O/P EST LOW 20 MIN: CPT | Performed by: CLINICAL NURSE SPECIALIST

## 2024-02-14 PROCEDURE — 80053 COMPREHEN METABOLIC PANEL: CPT

## 2024-02-14 PROCEDURE — 36415 COLL VENOUS BLD VENIPUNCTURE: CPT

## 2024-02-14 RX ORDER — FERROUS SULFATE 325(65) MG
325 TABLET ORAL EVERY OTHER DAY
Qty: 30 TABLET | Refills: 3 | Status: SHIPPED | OUTPATIENT
Start: 2024-02-14

## 2024-02-14 NOTE — PROGRESS NOTES
Department of [facility] Med Oncology   Attending Clinic Note    Reason for Visit: Follow-up on a patient with Iron deficiency anemia and alpha Thalassemia    PCP:  Courtney Bravo MD    History of Present Illness:  21 y/o female with hx of constipation who was noted to have microcytic anemia on routine blood work.   On 10/26/2021: Hb 5.9  Hct 17.7  MCV 53.1    WBC 4.8  Peripheral blood smear:   Microcytic hypochromic anemia with polychromasia, suggestive of iron deficiency. Unremarkable platelets and white blood cells  Retic 1.3 (0.4-1.9%)  Fe 10 TIBC 461 FeSat 2%  Transferrin 391 (200-360)  Ferritin 3  Given 1 unit prbc 10/26/2021  On 10/28/2021: Hb 7.8  Hct 29.1  MCV 58.3    WBC 4.0  Hb 8.5 on 11/03/2021.  She has been on FeSO4 325 mg po TID with improvement in her energy level.     On 11/11/2021: Hb 9.9  Hct 35.3  MCV 68.1  WBC 5.4    Peripheral blood smear:  Microcytic anemia with absolute red cell count within normal range.   Dimorphic population of red cells noted, which may indicate recent red blood cell transfusion.   Marked anisocytosis is present, with moderate poikilocytosis and hypochromia.    Rare schistocytes, target cells, and teardrop cells are all present.   Negative for rouleaux formation.   White blood cells and platelets (counts and morphology) are within normal limits.     BUN 11 Creat 0.8  LFTs wnl  Fe 176 TIBC 357 FeSat 49% Ferritin 82   Epo 13 (4-27)  Hemolytic work-up Negative  Zinc, VitB6, Copper WNL  SPEP: Normal. No monoclonal protein identified  SIFE: Normal. No monoclonal protein identified.     Sickle cell screen: Positive  Hemoglobin electrophoresis: Alpha (+) thalassemia (silent carrier) state (-a/aa)  Genetic counseling    Peripheral blood flow cytometry noted no immunophenotypic evidence of acute leukemia or a T-cell or B-cell neoplasm  Peripheral blood MDS FISH: Negative Study  JAK2 Mutation Not detected    Microcytic anemia combination of iron deficiency

## 2024-05-15 ENCOUNTER — HOSPITAL ENCOUNTER (OUTPATIENT)
Dept: INFUSION THERAPY | Age: 24
End: 2024-05-15

## 2024-06-12 ENCOUNTER — HOSPITAL ENCOUNTER (OUTPATIENT)
Dept: INFUSION THERAPY | Age: 24
Discharge: HOME | End: 2024-06-12
Payer: COMMERCIAL

## 2024-06-12 ENCOUNTER — OFFICE VISIT (OUTPATIENT)
Dept: ONCOLOGY | Age: 24
End: 2024-06-12
Payer: COMMERCIAL

## 2024-06-12 VITALS
SYSTOLIC BLOOD PRESSURE: 108 MMHG | OXYGEN SATURATION: 99 % | TEMPERATURE: 97.9 F | BODY MASS INDEX: 19.6 KG/M2 | HEART RATE: 97 BPM | HEIGHT: 62 IN | WEIGHT: 106.5 LBS | DIASTOLIC BLOOD PRESSURE: 59 MMHG

## 2024-06-12 DIAGNOSIS — D56.3 ALPHA THALASSEMIA SILENT CARRIER: Primary | ICD-10-CM

## 2024-06-12 DIAGNOSIS — D56.0 ALPHA THALASSEMIA (HCC): ICD-10-CM

## 2024-06-12 DIAGNOSIS — D50.9 IRON DEFICIENCY ANEMIA, UNSPECIFIED IRON DEFICIENCY ANEMIA TYPE: ICD-10-CM

## 2024-06-12 LAB
ALBUMIN SERPL-MCNC: 4.3 G/DL (ref 3.5–5.2)
ALP SERPL-CCNC: 62 U/L (ref 35–104)
ALT SERPL-CCNC: 13 U/L (ref 0–32)
ANION GAP SERPL CALCULATED.3IONS-SCNC: 12 MMOL/L (ref 7–16)
AST SERPL-CCNC: 17 U/L (ref 0–31)
BASOPHILS # BLD: 0.07 K/UL (ref 0–0.2)
BASOPHILS NFR BLD: 1 % (ref 0–2)
BILIRUB SERPL-MCNC: 0.4 MG/DL (ref 0–1.2)
BUN SERPL-MCNC: 10 MG/DL (ref 6–20)
CALCIUM SERPL-MCNC: 9.1 MG/DL (ref 8.6–10.2)
CHLORIDE SERPL-SCNC: 103 MMOL/L (ref 98–107)
CO2 SERPL-SCNC: 23 MMOL/L (ref 22–29)
CREAT SERPL-MCNC: 0.8 MG/DL (ref 0.5–1)
EOSINOPHIL # BLD: 0.13 K/UL (ref 0.05–0.5)
EOSINOPHILS RELATIVE PERCENT: 2 % (ref 0–6)
ERYTHROCYTE [DISTWIDTH] IN BLOOD BY AUTOMATED COUNT: 14.7 % (ref 11.5–15)
FERRITIN SERPL-MCNC: 13 NG/ML
GFR, ESTIMATED: >90 ML/MIN/1.73M2
GLUCOSE SERPL-MCNC: 88 MG/DL (ref 74–99)
HCT VFR BLD AUTO: 36.5 % (ref 34–48)
HGB BLD-MCNC: 11.2 G/DL (ref 11.5–15.5)
IMM GRANULOCYTES # BLD AUTO: 0.03 K/UL (ref 0–0.58)
IMM GRANULOCYTES NFR BLD: 1 % (ref 0–5)
IRON SATN MFR SERPL: 69 % (ref 15–50)
IRON SERPL-MCNC: 242 UG/DL (ref 37–145)
LYMPHOCYTES NFR BLD: 1.42 K/UL (ref 1.5–4)
LYMPHOCYTES RELATIVE PERCENT: 25 % (ref 20–42)
MCH RBC QN AUTO: 24.7 PG (ref 26–35)
MCHC RBC AUTO-ENTMCNC: 30.7 G/DL (ref 32–34.5)
MCV RBC AUTO: 80.6 FL (ref 80–99.9)
MONOCYTES NFR BLD: 0.47 K/UL (ref 0.1–0.95)
MONOCYTES NFR BLD: 8 % (ref 2–12)
NEUTROPHILS NFR BLD: 63 % (ref 43–80)
NEUTS SEG NFR BLD: 3.54 K/UL (ref 1.8–7.3)
PLATELET # BLD AUTO: 255 K/UL (ref 130–450)
PMV BLD AUTO: 10.5 FL (ref 7–12)
POTASSIUM SERPL-SCNC: 3.8 MMOL/L (ref 3.5–5)
PROT SERPL-MCNC: 7.7 G/DL (ref 6.4–8.3)
RBC # BLD AUTO: 4.53 M/UL (ref 3.5–5.5)
SODIUM SERPL-SCNC: 138 MMOL/L (ref 132–146)
TIBC SERPL-MCNC: 349 UG/DL (ref 250–450)
WBC OTHER # BLD: 5.7 K/UL (ref 4.5–11.5)

## 2024-06-12 PROCEDURE — 82728 ASSAY OF FERRITIN: CPT

## 2024-06-12 PROCEDURE — 36415 COLL VENOUS BLD VENIPUNCTURE: CPT

## 2024-06-12 PROCEDURE — 80053 COMPREHEN METABOLIC PANEL: CPT

## 2024-06-12 PROCEDURE — 83540 ASSAY OF IRON: CPT

## 2024-06-12 PROCEDURE — 85025 COMPLETE CBC W/AUTO DIFF WBC: CPT

## 2024-06-12 PROCEDURE — 83550 IRON BINDING TEST: CPT

## 2024-06-12 PROCEDURE — 99213 OFFICE O/P EST LOW 20 MIN: CPT | Performed by: CLINICAL NURSE SPECIALIST

## 2024-06-12 PROCEDURE — 99213 OFFICE O/P EST LOW 20 MIN: CPT

## 2024-06-12 RX ORDER — FERROUS SULFATE 325(65) MG
325 TABLET ORAL EVERY OTHER DAY
Qty: 30 TABLET | Refills: 3 | Status: SHIPPED | OUTPATIENT
Start: 2024-06-12

## 2024-06-12 NOTE — PROGRESS NOTES
Department of [facility] Oncology   Attending Clinic Note    Reason for Visit: Follow-up on a patient with Iron deficiency anemia and alpha Thalassemia    PCP:  Courtney Bravo MD    History of Present Illness:  21 y/o female with hx of constipation who was noted to have microcytic anemia on routine blood work.   On 10/26/2021: Hb 5.9  Hct 17.7  MCV 53.1    WBC 4.8  Peripheral blood smear:   Microcytic hypochromic anemia with polychromasia, suggestive of iron deficiency. Unremarkable platelets and white blood cells  Retic 1.3 (0.4-1.9%)  Fe 10 TIBC 461 FeSat 2%  Transferrin 391 (200-360)  Ferritin 3  Given 1 unit prbc 10/26/2021  On 10/28/2021: Hb 7.8  Hct 29.1  MCV 58.3    WBC 4.0  Hb 8.5 on 11/03/2021.  She has been on FeSO4 325 mg po TID with improvement in her energy level.     On 11/11/2021: Hb 9.9  Hct 35.3  MCV 68.1  WBC 5.4    Peripheral blood smear:  Microcytic anemia with absolute red cell count within normal range.   Dimorphic population of red cells noted, which may indicate recent red blood cell transfusion.   Marked anisocytosis is present, with moderate poikilocytosis and hypochromia.    Rare schistocytes, target cells, and teardrop cells are all present.   Negative for rouleaux formation.   White blood cells and platelets (counts and morphology) are within normal limits.     BUN 11 Creat 0.8  LFTs wnl  Fe 176 TIBC 357 FeSat 49% Ferritin 82   Epo 13 (4-27)  Hemolytic work-up Negative  Zinc, VitB6, Copper WNL  SPEP: Normal. No monoclonal protein identified  SIFE: Normal. No monoclonal protein identified.     Sickle cell screen: Positive  Hemoglobin electrophoresis: Alpha (+) thalassemia (silent carrier) state (-a/aa)  Genetic counseling    Peripheral blood flow cytometry noted no immunophenotypic evidence of acute leukemia or a T-cell or B-cell neoplasm  Peripheral blood MDS FISH: Negative Study  JAK2 Mutation Not detected    Microcytic anemia combination of iron deficiency

## 2024-06-13 NOTE — PROGRESS NOTES
Labs reviewed with Sonia TORIBIO NP. Patient notified to decrease oral iron tablet to Mon, Wed, Fri due to increase iron levels. Patient states understanding

## 2024-09-16 DIAGNOSIS — D50.9 IRON DEFICIENCY ANEMIA, UNSPECIFIED IRON DEFICIENCY ANEMIA TYPE: Primary | ICD-10-CM

## 2025-03-12 DIAGNOSIS — D50.9 IRON DEFICIENCY ANEMIA, UNSPECIFIED IRON DEFICIENCY ANEMIA TYPE: ICD-10-CM

## 2025-03-12 DIAGNOSIS — D56.0 ALPHA THALASSEMIA: ICD-10-CM

## 2025-03-12 RX ORDER — FERROUS SULFATE 325(65) MG
325 TABLET ORAL EVERY OTHER DAY
Qty: 30 TABLET | Refills: 3 | Status: CANCELLED | OUTPATIENT
Start: 2025-03-12

## 2025-03-12 RX ORDER — FERROUS SULFATE 325(65) MG
325 TABLET ORAL EVERY OTHER DAY
Qty: 45 TABLET | Refills: 2 | OUTPATIENT
Start: 2025-03-12